# Patient Record
Sex: FEMALE | Race: WHITE | Employment: FULL TIME | URBAN - METROPOLITAN AREA
[De-identification: names, ages, dates, MRNs, and addresses within clinical notes are randomized per-mention and may not be internally consistent; named-entity substitution may affect disease eponyms.]

---

## 2018-05-11 DIAGNOSIS — N39.0 URINARY TRACT INFECTION WITH HEMATURIA, SITE UNSPECIFIED: Primary | ICD-10-CM

## 2018-05-11 DIAGNOSIS — R31.9 URINARY TRACT INFECTION WITH HEMATURIA, SITE UNSPECIFIED: Primary | ICD-10-CM

## 2018-05-11 RX ORDER — NITROFURANTOIN 25; 75 MG/1; MG/1
100 CAPSULE ORAL 2 TIMES DAILY
Qty: 14 CAPSULE | Refills: 0 | Status: SHIPPED | OUTPATIENT
Start: 2018-05-11 | End: 2018-05-18

## 2018-06-14 ENCOUNTER — HOSPITAL ENCOUNTER (OUTPATIENT)
Age: 53
Discharge: HOME | End: 2018-06-16
Payer: COMMERCIAL

## 2018-06-14 LAB
ALBUMIN SERPL-MCNC: 4.3 G/DL (ref 3.5–5.2)
ALP BLD-CCNC: 86 U/L (ref 35–104)
ALT SERPL-CCNC: 13 U/L (ref 0–32)
ANION GAP SERPL CALCULATED.3IONS-SCNC: 15 MMOL/L (ref 7–16)
AST SERPL-CCNC: 13 U/L (ref 0–31)
BASOPHILS ABSOLUTE: 0.12 E9/L (ref 0–0.2)
BASOPHILS RELATIVE PERCENT: 1.7 % (ref 0–2)
BILIRUB SERPL-MCNC: 0.8 MG/DL (ref 0–1.2)
BUN BLDV-MCNC: 16 MG/DL (ref 6–20)
CALCIUM SERPL-MCNC: 9.4 MG/DL (ref 8.6–10.2)
CHLORIDE BLD-SCNC: 101 MMOL/L (ref 98–107)
CHOLESTEROL, TOTAL: 191 MG/DL (ref 0–199)
CO2: 25 MMOL/L (ref 22–29)
CREAT SERPL-MCNC: 0.7 MG/DL (ref 0.5–1)
EOSINOPHILS ABSOLUTE: 0.42 E9/L (ref 0.05–0.5)
EOSINOPHILS RELATIVE PERCENT: 5.9 % (ref 0–6)
GFR AFRICAN AMERICAN: >60
GFR NON-AFRICAN AMERICAN: >60 ML/MIN/1.73
GLUCOSE BLD-MCNC: 96 MG/DL (ref 74–109)
HCT VFR BLD CALC: 44.5 % (ref 34–48)
HDLC SERPL-MCNC: 52 MG/DL
HEMOGLOBIN: 14.2 G/DL (ref 11.5–15.5)
IMMATURE GRANULOCYTES #: 0.02 E9/L
IMMATURE GRANULOCYTES %: 0.3 % (ref 0–5)
LDL CHOLESTEROL CALCULATED: 121 MG/DL (ref 0–99)
LYMPHOCYTES ABSOLUTE: 1.74 E9/L (ref 1.5–4)
LYMPHOCYTES RELATIVE PERCENT: 24.5 % (ref 20–42)
MCH RBC QN AUTO: 26.5 PG (ref 26–35)
MCHC RBC AUTO-ENTMCNC: 31.9 % (ref 32–34.5)
MCV RBC AUTO: 83.2 FL (ref 80–99.9)
MONOCYTES ABSOLUTE: 0.54 E9/L (ref 0.1–0.95)
MONOCYTES RELATIVE PERCENT: 7.6 % (ref 2–12)
NEUTROPHILS ABSOLUTE: 4.25 E9/L (ref 1.8–7.3)
NEUTROPHILS RELATIVE PERCENT: 60 % (ref 43–80)
PDW BLD-RTO: 13.6 FL (ref 11.5–15)
PLATELET # BLD: 444 E9/L (ref 130–450)
PMV BLD AUTO: 10.1 FL (ref 7–12)
POTASSIUM SERPL-SCNC: 3.9 MMOL/L (ref 3.5–5)
RBC # BLD: 5.35 E12/L (ref 3.5–5.5)
SODIUM BLD-SCNC: 141 MMOL/L (ref 132–146)
T4 FREE: 1.66 NG/DL (ref 0.93–1.7)
TOTAL PROTEIN: 7.5 G/DL (ref 6.4–8.3)
TRIGL SERPL-MCNC: 88 MG/DL (ref 0–149)
TSH SERPL DL<=0.05 MIU/L-ACNC: 1.05 UIU/ML (ref 0.27–4.2)
VLDLC SERPL CALC-MCNC: 18 MG/DL
WBC # BLD: 7.1 E9/L (ref 4.5–11.5)

## 2018-06-14 PROCEDURE — 85025 COMPLETE CBC W/AUTO DIFF WBC: CPT

## 2018-06-14 PROCEDURE — 84439 ASSAY OF FREE THYROXINE: CPT

## 2018-06-14 PROCEDURE — 84443 ASSAY THYROID STIM HORMONE: CPT

## 2018-06-14 PROCEDURE — 80061 LIPID PANEL: CPT

## 2018-06-14 PROCEDURE — 80053 COMPREHEN METABOLIC PANEL: CPT

## 2018-09-28 ENCOUNTER — NURSE ONLY (OUTPATIENT)
Dept: PRIMARY CARE CLINIC | Age: 53
End: 2018-09-28
Payer: COMMERCIAL

## 2018-09-28 DIAGNOSIS — Z23 NEED FOR INFLUENZA VACCINATION: Primary | ICD-10-CM

## 2018-09-28 PROCEDURE — 90686 IIV4 VACC NO PRSV 0.5 ML IM: CPT | Performed by: FAMILY MEDICINE

## 2018-09-28 PROCEDURE — 90471 IMMUNIZATION ADMIN: CPT | Performed by: FAMILY MEDICINE

## 2018-10-11 ENCOUNTER — HOSPITAL ENCOUNTER (OUTPATIENT)
Dept: GENERAL RADIOLOGY | Age: 53
Discharge: HOME | End: 2018-10-13
Payer: COMMERCIAL

## 2018-10-11 DIAGNOSIS — E28.39 HYPOGONADISM, OVARIAN: ICD-10-CM

## 2018-10-11 DIAGNOSIS — N95.1 MENOPAUSAL SYMPTOMS: ICD-10-CM

## 2018-10-11 DIAGNOSIS — Z12.31 VISIT FOR SCREENING MAMMOGRAM: ICD-10-CM

## 2018-10-11 PROCEDURE — 77080 DXA BONE DENSITY AXIAL: CPT

## 2018-10-11 PROCEDURE — 77063 BREAST TOMOSYNTHESIS BI: CPT

## 2018-10-30 ENCOUNTER — HOSPITAL ENCOUNTER (OUTPATIENT)
Dept: ULTRASOUND IMAGING | Age: 53
Discharge: HOME | End: 2018-11-01
Payer: COMMERCIAL

## 2018-10-30 DIAGNOSIS — M79.661 PAIN OF RIGHT CALF: ICD-10-CM

## 2018-10-30 DIAGNOSIS — M79.661 RIGHT CALF PAIN: Primary | ICD-10-CM

## 2018-10-30 PROCEDURE — 93971 EXTREMITY STUDY: CPT

## 2018-10-30 NOTE — PROGRESS NOTES
Patient recently back from the cruise. She was on airplane as well. Patient now having right calf pain. Physical exam: Right calf with tenderness to palpation. No redness or warmth to the skin noted. Assessment: Right calf pain and swelling. Plan: Ultrasound right lower extremity.

## 2018-10-31 ENCOUNTER — HOSPITAL ENCOUNTER (OUTPATIENT)
Dept: ULTRASOUND IMAGING | Age: 53
Discharge: HOME | End: 2018-11-02
Payer: COMMERCIAL

## 2018-10-31 DIAGNOSIS — M79.661 RIGHT CALF PAIN: ICD-10-CM

## 2018-12-18 ENCOUNTER — TELEPHONE (OUTPATIENT)
Dept: ADMINISTRATIVE | Age: 53
End: 2018-12-18

## 2019-01-21 ENCOUNTER — OFFICE VISIT (OUTPATIENT)
Dept: PRIMARY CARE CLINIC | Age: 54
End: 2019-01-21
Payer: COMMERCIAL

## 2019-01-21 VITALS
SYSTOLIC BLOOD PRESSURE: 120 MMHG | TEMPERATURE: 97.3 F | HEART RATE: 69 BPM | OXYGEN SATURATION: 98 % | DIASTOLIC BLOOD PRESSURE: 82 MMHG

## 2019-01-21 DIAGNOSIS — J06.9 UPPER RESPIRATORY TRACT INFECTION, UNSPECIFIED TYPE: ICD-10-CM

## 2019-01-21 DIAGNOSIS — J45.21 MILD INTERMITTENT ASTHMA WITH ACUTE EXACERBATION: Primary | ICD-10-CM

## 2019-01-21 PROCEDURE — 99213 OFFICE O/P EST LOW 20 MIN: CPT | Performed by: PHYSICIAN ASSISTANT

## 2019-01-21 RX ORDER — PREDNISONE 20 MG/1
20 TABLET ORAL 2 TIMES DAILY
Qty: 10 TABLET | Refills: 0 | Status: SHIPPED | OUTPATIENT
Start: 2019-01-21 | End: 2019-01-26

## 2019-01-21 RX ORDER — ALBUTEROL SULFATE 90 UG/1
2 AEROSOL, METERED RESPIRATORY (INHALATION) EVERY 4 HOURS PRN
Qty: 1 INHALER | Refills: 0 | Status: SHIPPED
Start: 2019-01-21 | End: 2020-04-07 | Stop reason: SDUPTHER

## 2019-01-21 RX ORDER — AZITHROMYCIN 250 MG/1
250 TABLET, FILM COATED ORAL SEE ADMIN INSTRUCTIONS
Qty: 6 TABLET | Refills: 0 | Status: SHIPPED | OUTPATIENT
Start: 2019-01-21 | End: 2019-01-26

## 2019-01-21 RX ORDER — FLUTICASONE PROPIONATE 50 MCG
2 SPRAY, SUSPENSION (ML) NASAL DAILY
Qty: 1 BOTTLE | Refills: 0 | Status: SHIPPED | OUTPATIENT
Start: 2019-01-21 | End: 2019-09-26

## 2019-01-21 ASSESSMENT — PATIENT HEALTH QUESTIONNAIRE - PHQ9
1. LITTLE INTEREST OR PLEASURE IN DOING THINGS: 0
SUM OF ALL RESPONSES TO PHQ QUESTIONS 1-9: 0
SUM OF ALL RESPONSES TO PHQ9 QUESTIONS 1 & 2: 0
SUM OF ALL RESPONSES TO PHQ QUESTIONS 1-9: 0
2. FEELING DOWN, DEPRESSED OR HOPELESS: 0

## 2019-02-14 ENCOUNTER — TELEPHONE (OUTPATIENT)
Dept: PRIMARY CARE CLINIC | Age: 54
End: 2019-02-14

## 2019-02-14 DIAGNOSIS — R07.9 CHEST PAIN, UNSPECIFIED TYPE: Primary | ICD-10-CM

## 2019-02-14 DIAGNOSIS — R07.9 CHEST PAIN, UNSPECIFIED TYPE: ICD-10-CM

## 2019-02-14 PROCEDURE — 93000 ELECTROCARDIOGRAM COMPLETE: CPT | Performed by: FAMILY MEDICINE

## 2019-04-10 ENCOUNTER — OFFICE VISIT (OUTPATIENT)
Dept: CARDIOLOGY CLINIC | Age: 54
End: 2019-04-10
Payer: COMMERCIAL

## 2019-04-10 VITALS
DIASTOLIC BLOOD PRESSURE: 88 MMHG | RESPIRATION RATE: 16 BRPM | BODY MASS INDEX: 33.49 KG/M2 | HEIGHT: 62 IN | SYSTOLIC BLOOD PRESSURE: 120 MMHG | HEART RATE: 53 BPM | WEIGHT: 182 LBS

## 2019-04-10 DIAGNOSIS — R06.09 DYSPNEA ON EXERTION: Primary | ICD-10-CM

## 2019-04-10 DIAGNOSIS — I51.9 HEART PROBLEM: ICD-10-CM

## 2019-04-10 DIAGNOSIS — I34.1 MVP (MITRAL VALVE PROLAPSE): ICD-10-CM

## 2019-04-10 PROCEDURE — 93000 ELECTROCARDIOGRAM COMPLETE: CPT | Performed by: INTERNAL MEDICINE

## 2019-04-10 PROCEDURE — 99243 OFF/OP CNSLTJ NEW/EST LOW 30: CPT | Performed by: INTERNAL MEDICINE

## 2019-04-10 NOTE — PROGRESS NOTES
OUTPATIENT CARDIOLOGY CONSULT    Name: Carl Velasquez    Age: 47 y.o. Date of Service: 4/10/2019    Reason for Consultation: Dyspnea    Referring Physician: Jerrell Redd DO    History of Present Illness:  Carl Velasquez is a 47 y.o. female who presents today for further evaluation of dyspnea. She was previously evaluated by Dr. Shay Goodson. Her PMH is outlined in detail below (see A/P below). She is routinely active (including riding a bike 30 mis/day 3 days/week and exercising on a treadmill 30 mins/day 3 times/week) with no complaints of chest pain, SOB, palpitations, lightheadedness, dizziness, or syncope. No history of PND or orthopnea. She reports a > 3 month history of intermittent dyspnea -- not related to exertion, 1-2 episodes/month, episodes last seconds, \"think it might be associated with my allergies\". +ncreased stress at home (caring for her parents). She is currently with no active cardiac complaints at rest. SR on EKG. She works for [employer].     Review of Systems:  Cardiac: As per HPI  General: No fever, chills  Pulmonary: As per HPI  HEENT: No visual disturbances, difficult swallowing  GI: No nausea, vomiting  Endocrine: No thyroid disease or DM  Musculoskeletal: CASSIDY x 4, no focal motor deficits  Skin: Intact, no rashes  Neuro/Psych: No headache or seizures    Past Medical History:  Past Medical History:   Diagnosis Date    Asthma     no recent issues, was released from Dr. Malgorzata King, feel it is allergen induced , seasonal    Murmur     h/o, no longer present       Past Surgical History:  Past Surgical History:   Procedure Laterality Date    APPENDECTOMY  2001    COLONOSCOPY  08/09/2017    ECTOPIC PREGNANCY SURGERY  1995    THYROID SURGERY  1985    partial thyroidectomy       Family History:  Family History   Problem Relation Age of Onset    High Blood Pressure Mother     Heart Failure Father     Stroke Father     Pacemaker Father     Diabetes Father    [facility] COPD Brother        Social History:  Social History     Socioeconomic History    Marital status:      Spouse name: Not on file    Number of children: 3    Years of education: Not on file    Highest education level: Not on file   Occupational History    Not on file   Social Needs    Financial resource strain: Not on file    Food insecurity:     Worry: Not on file     Inability: Not on file    Transportation needs:     Medical: Not on file     Non-medical: Not on file   Tobacco Use    Smoking status: Never Smoker    Smokeless tobacco: Never Used   Substance and Sexual Activity    Alcohol use: No     Comment: social drinker     Drug use: No    Sexual activity: Not on file   Lifestyle    Physical activity:     Days per week: Not on file     Minutes per session: Not on file    Stress: Not on file   Relationships    Social connections:     Talks on phone: Not on file     Gets together: Not on file     Attends Gnosticism service: Not on file     Active member of club or organization: Not on file     Attends meetings of clubs or organizations: Not on file     Relationship status: Not on file    Intimate partner violence:     Fear of current or ex partner: Not on file     Emotionally abused: Not on file     Physically abused: Not on file     Forced sexual activity: Not on file   Other Topics Concern    Not on file   Social History Narrative    Not on file       Allergies:   Allergies   Allergen Reactions    Codeine Other (See Comments)     Heart raced, felt hot    Morphine Nausea Only    Demerol Hcl [Meperidine] Palpitations       Current Medications:  Current Outpatient Medications   Medication Sig Dispense Refill    albuterol sulfate HFA (PROAIR HFA) 108 (90 Base) MCG/ACT inhaler Inhale 2 puffs into the lungs every 4 hours as needed for Wheezing or Shortness of Breath 1 Inhaler 0    omeprazole (PRILOSEC) 40 MG delayed release capsule Take 40 mg by mouth nightly Indications: pt. is taking 1tablet 3times a week       fluticasone (FLONASE) 50 MCG/ACT nasal spray 2 sprays by Each Nare route daily 1 Bottle 0    fluticasone-vilanterol (BREO ELLIPTA) 100-25 MCG/INH AEPB Inhale 1 Act into the lungs daily 28 each 0    Loratadine (CLARITIN PO) Take 10 mg by mouth as needed (For Allergies)        No current facility-administered medications for this visit. Physical Exam:  /88   Pulse 53   Resp 16   Ht 5' 2\" (1.575 m)   Wt 182 lb (82.6 kg)   BMI 33.29 kg/m²   Wt Readings from Last 3 Encounters:   04/10/19 182 lb (82.6 kg)   11/21/17 188 lb (85.3 kg)   08/09/17 190 lb (86.2 kg)     Appearance: Awake, alert, no acute respiratory distress  Skin: Intact, no rash  Head: Normocephalic, atraumatic  Eyes: EOMI, no conjunctival erythema  ENMT: No pharyngeal erythema, MMM, no rhinorrhea  Neck: Supple, no elevated JVP, no carotid bruits  Lungs: Clear to auscultation bilaterally. No wheezes, rales, or rhonchi.   Cardiac: Regular rate and rhythm, +S1S2, no murmurs apparent  Abdomen: Soft, nontender, +bowel sounds  Extremities: Moves all extremities x 4, no lower extremity edema  Neurologic: No focal motor deficits apparent, normal mood and affect    Laboratory Tests:  Lab Results   Component Value Date    CREATININE 0.7 06/14/2018    BUN 16 06/14/2018     06/14/2018    K 3.9 06/14/2018     06/14/2018    CO2 25 06/14/2018     Lab Results   Component Value Date    MG 2.3 03/01/2016     Lab Results   Component Value Date    WBC 7.1 06/14/2018    HGB 14.2 06/14/2018    HCT 44.5 06/14/2018    MCV 83.2 06/14/2018     06/14/2018     Lab Results   Component Value Date    ALT 13 06/14/2018    AST 13 06/14/2018    ALKPHOS 86 06/14/2018    BILITOT 0.8 06/14/2018     Lab Results   Component Value Date    CKTOTAL 40 02/29/2016    CKMB 1.6 02/29/2016    TROPONINI <0.01 02/29/2016    TROPONINI <0.01 02/16/2016    TROPONINI <0.01 02/15/2016     Lab Results   Component Value Date    INR 1.5 02/29/2016 INR 1.5 02/15/2016    PROTIME 16.1 (H) 02/29/2016    PROTIME 17.3 (H) 02/15/2016     Lab Results   Component Value Date    TSH 1.050 06/14/2018     Lab Results   Component Value Date    LABA1C 5.8 12/14/2017     No results found for: EAG  Lab Results   Component Value Date    CHOL 191 06/14/2018    CHOL 192 12/14/2017    CHOL 186 03/17/2017     Lab Results   Component Value Date    TRIG 88 06/14/2018    TRIG 95 12/14/2017    TRIG 98 03/17/2017     Lab Results   Component Value Date    HDL 52 06/14/2018    HDL 48 12/14/2017    HDL 60 03/17/2017     Lab Results   Component Value Date    LDLCALC 121 (H) 06/14/2018    LDLCALC 125 (H) 12/14/2017    LDLCALC 106 (H) 03/17/2017     Lab Results   Component Value Date    LABVLDL 18 06/14/2018    LABVLDL 19 12/14/2017    LABVLDL 20 03/17/2017     No results found for: CHOLHDLRATIO  No results for input(s): PROBNP in the last 72 hours. Cardiac Tests:  ECG: SB, NSSTT changes    Exercise nuclear stress test: 2/18/16  IMPRESSION:  Normal examination.  In particular, there is no   evidence of treadmill stress induced left ventricular myocardial   ischemia.      Gated study shows normal left   ventricular wall motion and myocardial thickening during systole. Resting left ventricular ejection fraction over 70%. Echocardiogram: 2/17/16 (Dr. Barbara Richardson)   Left ventricle grossly normal in size.   Mild left ventricular concentric hypertrophy noted.   Hyperdynamic. LV segmental wall motion.   Estimated left ventricular ejection fraction is 75 %.   Normal left ventricular diastolic filling velocities for age.   Estimated wedge pressure is 7 mmHg.   Normal right ventricular size and function.   Physiologic and/or trace mitral regurgitation is present.   Physiologic and/or trace tricuspid regurgitation. Technically good quality study.   Technically good quality study. No comparison study available. Suggest clinical correlation.     Echocardiogram: 6/28/18 (Dr. Barbara Richardson)  Normal LV size and wall thickness  Normal LV function / EF 65-70%  Normal RV size and function  Trace MR  Trace TR / normal estimated PASP      ASSESSMENT / PLAN:  1. Dyspnea (as outlined above in HPI) -- routinely active with no cardiac complaints  2. History of possible MVP per patient -- recent echocardiograms with no mention of MVP  3. BMI 33.3  4. GERD  5. Asthma / allergies    - Prior cardiac studies reviewed today (pertinent results outlined above)  - Continue current medications  - Pending clinical course, options for cardiac testing discussed (decision made to hold off at this time)    Thank you for allowing me to participate in your patient's care. Please feel free to contact me if you have any questions or concerns.     Betsy Martínez MD  [facility]) Cardiology

## 2019-09-26 ENCOUNTER — OFFICE VISIT (OUTPATIENT)
Dept: PRIMARY CARE CLINIC | Age: 54
End: 2019-09-26
Payer: COMMERCIAL

## 2019-09-26 VITALS
OXYGEN SATURATION: 98 % | TEMPERATURE: 97.9 F | DIASTOLIC BLOOD PRESSURE: 84 MMHG | HEART RATE: 69 BPM | SYSTOLIC BLOOD PRESSURE: 138 MMHG

## 2019-09-26 DIAGNOSIS — J01.90 ACUTE NON-RECURRENT SINUSITIS, UNSPECIFIED LOCATION: Primary | ICD-10-CM

## 2019-09-26 PROCEDURE — 99213 OFFICE O/P EST LOW 20 MIN: CPT | Performed by: PHYSICIAN ASSISTANT

## 2019-09-26 RX ORDER — AMOXICILLIN AND CLAVULANATE POTASSIUM 875; 125 MG/1; MG/1
1 TABLET, FILM COATED ORAL 2 TIMES DAILY
Qty: 14 TABLET | Refills: 0 | Status: SHIPPED | OUTPATIENT
Start: 2019-09-26 | End: 2019-09-27 | Stop reason: SINTOL

## 2019-09-26 NOTE — PROGRESS NOTES
her mother; Pacemaker in her father; Stroke in her father. Allergies: Codeine; Morphine; and Demerol hcl [meperidine]    Physical Exam         VS:  /84   Pulse 69   Temp 97.9 °F (36.6 °C)   LMP 05/31/2017   SpO2 98%    Oxygen Saturation Interpretation: Normal.    Constitutional:  Alert, development consistent with age. Ears:  External Ears: Bilateral pinna normal. TM's without erythema or perforation bilaterally. Canals normal bilaterally. Moderate serous fluid noted on the L. Nose:   There is bilateral injection and edema to turbinates bilaterally. +TTP over maxillary sinuses. Mouth: Normal to inspection. Throat: Mild posterior pharyngeal erythema with mild post nasal drip present. No exudate. Neck:  Supple. There is no obvious head or neck adenopathy. Lungs:   Breath sounds: Non-labored, equal, and without adventitious sounds. No wheezing, rales, or rhonchi. Heart:  Regular rate and rhythm, normal heart sounds, without pathological murmurs, ectopy, gallops, or rubs. Skin:  Normal turgor. Warm, dry, without visible rash. Neurological:  Alert and oriented. Motor functions intact. Responds to verbal commands. Lab / Imaging Results   (All laboratory and radiology results have been personally reviewed by myself)  Labs:  No results found for this visit on 09/26/19. Assessment / Plan     Impression(s):  1. Acute non-recurrent sinusitis, unspecified location      Disposition:    Rx (Augmentin) were discussed with patient includining indications and adverse effects, pt agrees and understands use. Recommended fluids and rest. OTC Ibuprofen/Tylenol if needed for pain/fever. Nasal saline spray recommended due to bloody noses, once stops can resume the flonase, OTC Mucinex or keep with the advil sinus. FU if Sx persist or worsen, if severe or worrisome-go to ER. Otherwise, FU for routine care.

## 2019-09-27 RX ORDER — AZITHROMYCIN 250 MG/1
250 TABLET, FILM COATED ORAL SEE ADMIN INSTRUCTIONS
Qty: 6 TABLET | Refills: 0 | Status: SHIPPED | OUTPATIENT
Start: 2019-09-27 | End: 2019-10-02

## 2019-10-18 ENCOUNTER — HOSPITAL ENCOUNTER (OUTPATIENT)
Dept: GENERAL RADIOLOGY | Age: 54
Discharge: HOME | End: 2019-10-20
Payer: COMMERCIAL

## 2019-10-18 DIAGNOSIS — Z12.31 ENCOUNTER FOR SCREENING MAMMOGRAM FOR MALIGNANT NEOPLASM OF BREAST: ICD-10-CM

## 2019-10-18 PROCEDURE — 77063 BREAST TOMOSYNTHESIS BI: CPT

## 2020-01-03 ENCOUNTER — TELEPHONE (OUTPATIENT)
Dept: PRIMARY CARE CLINIC | Age: 55
End: 2020-01-03
Payer: COMMERCIAL

## 2020-01-03 LAB
APPEARANCE FLUID: NORMAL
BILIRUBIN, POC: NORMAL
BLOOD URINE, POC: NORMAL
CLARITY, POC: CLEAR
COLOR, POC: YELLOW
GLUCOSE URINE, POC: NORMAL
KETONES, POC: NORMAL
LEUKOCYTE EST, POC: NORMAL
NITRITE, POC: NORMAL
PH, POC: 5
PROTEIN, POC: NORMAL
SPECIFIC GRAVITY, POC: 1.02
UROBILINOGEN, POC: 0.2

## 2020-01-03 PROCEDURE — 81002 URINALYSIS NONAUTO W/O SCOPE: CPT | Performed by: PHYSICIAN ASSISTANT

## 2020-01-03 RX ORDER — NITROFURANTOIN 25; 75 MG/1; MG/1
100 CAPSULE ORAL 2 TIMES DAILY
Qty: 14 CAPSULE | Refills: 0 | Status: SHIPPED | OUTPATIENT
Start: 2020-01-03 | End: 2020-01-21 | Stop reason: SDUPTHER

## 2020-01-10 ENCOUNTER — OFFICE VISIT (OUTPATIENT)
Dept: PRIMARY CARE CLINIC | Age: 55
End: 2020-01-10
Payer: COMMERCIAL

## 2020-01-10 ENCOUNTER — HOSPITAL ENCOUNTER (OUTPATIENT)
Age: 55
Discharge: HOME | End: 2020-01-12
Payer: COMMERCIAL

## 2020-01-10 VITALS
SYSTOLIC BLOOD PRESSURE: 130 MMHG | OXYGEN SATURATION: 98 % | DIASTOLIC BLOOD PRESSURE: 84 MMHG | HEART RATE: 74 BPM | TEMPERATURE: 97.2 F

## 2020-01-10 LAB
BASOPHILS ABSOLUTE: 0.09 E9/L (ref 0–0.2)
BASOPHILS RELATIVE PERCENT: 1 % (ref 0–2)
EOSINOPHILS ABSOLUTE: 0.91 E9/L (ref 0.05–0.5)
EOSINOPHILS RELATIVE PERCENT: 9.8 % (ref 0–6)
HCT VFR BLD CALC: 47.9 % (ref 34–48)
HEMOGLOBIN: 14.8 G/DL (ref 11.5–15.5)
IMMATURE GRANULOCYTES #: 0.03 E9/L
IMMATURE GRANULOCYTES %: 0.3 % (ref 0–5)
LYMPHOCYTES ABSOLUTE: 2.13 E9/L (ref 1.5–4)
LYMPHOCYTES RELATIVE PERCENT: 22.9 % (ref 20–42)
MCH RBC QN AUTO: 26.5 PG (ref 26–35)
MCHC RBC AUTO-ENTMCNC: 30.9 % (ref 32–34.5)
MCV RBC AUTO: 85.8 FL (ref 80–99.9)
MONOCYTES ABSOLUTE: 0.72 E9/L (ref 0.1–0.95)
MONOCYTES RELATIVE PERCENT: 7.7 % (ref 2–12)
NEUTROPHILS ABSOLUTE: 5.43 E9/L (ref 1.8–7.3)
NEUTROPHILS RELATIVE PERCENT: 58.3 % (ref 43–80)
PDW BLD-RTO: 12.2 FL (ref 11.5–15)
PLATELET # BLD: 477 E9/L (ref 130–450)
PMV BLD AUTO: 10.3 FL (ref 7–12)
RBC # BLD: 5.58 E12/L (ref 3.5–5.5)
WBC # BLD: 9.3 E9/L (ref 4.5–11.5)

## 2020-01-10 PROCEDURE — 99213 OFFICE O/P EST LOW 20 MIN: CPT | Performed by: FAMILY MEDICINE

## 2020-01-10 PROCEDURE — 86664 EPSTEIN-BARR NUCLEAR ANTIGEN: CPT

## 2020-01-10 PROCEDURE — 85025 COMPLETE CBC W/AUTO DIFF WBC: CPT

## 2020-01-10 PROCEDURE — 86665 EPSTEIN-BARR CAPSID VCA: CPT

## 2020-01-10 PROCEDURE — 86663 EPSTEIN-BARR ANTIBODY: CPT

## 2020-01-10 RX ORDER — METHYLPREDNISOLONE 4 MG/1
TABLET ORAL
Qty: 1 KIT | Refills: 0 | Status: SHIPPED | OUTPATIENT
Start: 2020-01-10 | End: 2020-01-22 | Stop reason: ALTCHOICE

## 2020-01-10 ASSESSMENT — PATIENT HEALTH QUESTIONNAIRE - PHQ9
SUM OF ALL RESPONSES TO PHQ QUESTIONS 1-9: 0
SUM OF ALL RESPONSES TO PHQ9 QUESTIONS 1 & 2: 0
1. LITTLE INTEREST OR PLEASURE IN DOING THINGS: 0
2. FEELING DOWN, DEPRESSED OR HOPELESS: 0
SUM OF ALL RESPONSES TO PHQ QUESTIONS 1-9: 0

## 2020-01-10 ASSESSMENT — ENCOUNTER SYMPTOMS
VOMITING: 0
SWOLLEN GLANDS: 1
SORE THROAT: 0
COUGH: 0

## 2020-01-10 NOTE — PROGRESS NOTES
Hugo Bailey, a female of 49 y.o. came to the office 1/10/2020. Patient Active Problem List   Diagnosis    Hypothyroidism    Mitral valve prolapse    Eosinophilia    Asthma exacerbation    SIRS (systemic inflammatory response syndrome) (HCC)    Microcytosis          Other   This is a new problem. The current episode started 1 to 4 weeks ago (10 days). The problem has been gradually worsening. Associated symptoms include fatigue, headaches and swollen glands (b/l parotid glands 3 days ago worse last pm.  applying ice to area. ). Pertinent negatives include no anorexia, chills, congestion, coughing, diaphoresis, fever, myalgias, neck pain, sore throat, vomiting or weakness. Nothing aggravates the symptoms. Treatments tried: took amoxil 500 mg bid for 1 wk. Allergies   Allergen Reactions    Codeine Other (See Comments)     Heart raced, felt hot    Morphine Nausea Only    Demerol Hcl [Meperidine] Palpitations       Current Outpatient Medications on File Prior to Visit   Medication Sig Dispense Refill    nitrofurantoin, macrocrystal-monohydrate, (MACROBID) 100 MG capsule Take 1 capsule by mouth 2 times daily for 7 days 14 capsule 0    albuterol sulfate HFA (PROAIR HFA) 108 (90 Base) MCG/ACT inhaler Inhale 2 puffs into the lungs every 4 hours as needed for Wheezing or Shortness of Breath 1 Inhaler 0    omeprazole (PRILOSEC) 40 MG delayed release capsule Take 40 mg by mouth nightly Indications: pt. is taking 1tablet 3times a week        No current facility-administered medications on file prior to visit. Review of Systems   Constitutional: Positive for fatigue. Negative for chills, diaphoresis and fever. HENT: Negative for congestion and sore throat. Respiratory: Negative for cough. Gastrointestinal: Negative for anorexia and vomiting. Musculoskeletal: Negative for myalgias and neck pain. Neurological: Positive for headaches. Negative for weakness.    other review of systems

## 2020-01-13 ENCOUNTER — TELEPHONE (OUTPATIENT)
Dept: PRIMARY CARE CLINIC | Age: 55
End: 2020-01-13

## 2020-01-13 LAB
EPSTEIN BARR VIRUS NUCLEAR AB IGG: <3 U/ML (ref 0–21.9)
EPSTEIN-BARR EARLY ANTIGEN ANTIBODY: 5.2 U/ML (ref 0–10.9)
EPSTEIN-BARR VCA IGG: 292 U/ML (ref 0–21.9)
EPSTEIN-BARR VCA IGM: <10 U/ML (ref 0–43.9)

## 2020-01-13 NOTE — TELEPHONE ENCOUNTER
Spoke with patient she is feeling better with decreased swelling in her parotid glands. She is doing well on the steroid. No fever or chills. I reviewed her lab results with her. Continue current care.

## 2020-01-21 RX ORDER — NITROFURANTOIN 25; 75 MG/1; MG/1
100 CAPSULE ORAL 2 TIMES DAILY
Qty: 14 CAPSULE | Refills: 0 | Status: ON HOLD | OUTPATIENT
Start: 2020-01-21 | End: 2020-01-23 | Stop reason: HOSPADM

## 2020-01-22 ENCOUNTER — APPOINTMENT (OUTPATIENT)
Dept: GENERAL RADIOLOGY | Age: 55
DRG: 313 | End: 2020-01-22
Payer: COMMERCIAL

## 2020-01-22 ENCOUNTER — APPOINTMENT (OUTPATIENT)
Dept: CT IMAGING | Age: 55
DRG: 313 | End: 2020-01-22
Payer: COMMERCIAL

## 2020-01-22 ENCOUNTER — NURSE TRIAGE (OUTPATIENT)
Dept: OTHER | Facility: CLINIC | Age: 55
End: 2020-01-22

## 2020-01-22 ENCOUNTER — HOSPITAL ENCOUNTER (INPATIENT)
Age: 55
LOS: 1 days | Discharge: HOME | DRG: 313 | End: 2020-01-23
Attending: EMERGENCY MEDICINE | Admitting: INTERNAL MEDICINE
Payer: COMMERCIAL

## 2020-01-22 PROBLEM — R55 SYNCOPAL EPISODES: Status: ACTIVE | Noted: 2020-01-22

## 2020-01-22 PROBLEM — R07.9 CHEST PAIN: Status: ACTIVE | Noted: 2020-01-22

## 2020-01-22 PROBLEM — R93.89 ABNORMAL CT OF THE CHEST: Status: ACTIVE | Noted: 2020-01-22

## 2020-01-22 LAB
ADENOVIRUS BY PCR: NOT DETECTED
ALBUMIN SERPL-MCNC: 4.3 G/DL (ref 3.5–5.2)
ALP BLD-CCNC: 101 U/L (ref 35–104)
ALT SERPL-CCNC: 18 U/L (ref 0–32)
ANION GAP SERPL CALCULATED.3IONS-SCNC: 14 MMOL/L (ref 7–16)
APTT: 28.5 SEC (ref 24.5–35.1)
AST SERPL-CCNC: 17 U/L (ref 0–31)
BACTERIA: ABNORMAL /HPF
BASOPHILS ABSOLUTE: 0.04 E9/L (ref 0–0.2)
BASOPHILS RELATIVE PERCENT: 0.2 % (ref 0–2)
BILIRUB SERPL-MCNC: 0.7 MG/DL (ref 0–1.2)
BILIRUBIN URINE: NEGATIVE
BLOOD, URINE: ABNORMAL
BORDETELLA PARAPERTUSSIS BY PCR: NOT DETECTED
BORDETELLA PERTUSSIS BY PCR: NOT DETECTED
BUN BLDV-MCNC: 13 MG/DL (ref 6–20)
C-REACTIVE PROTEIN: 5.3 MG/DL (ref 0–0.4)
CALCIUM SERPL-MCNC: 9.6 MG/DL (ref 8.6–10.2)
CHLAMYDOPHILIA PNEUMONIAE BY PCR: NOT DETECTED
CHLORIDE BLD-SCNC: 102 MMOL/L (ref 98–107)
CHOLESTEROL, TOTAL: 237 MG/DL (ref 0–199)
CLARITY: CLEAR
CO2: 25 MMOL/L (ref 22–29)
COLOR: YELLOW
CORONAVIRUS 229E BY PCR: NOT DETECTED
CORONAVIRUS HKU1 BY PCR: NOT DETECTED
CORONAVIRUS NL63 BY PCR: NOT DETECTED
CORONAVIRUS OC43 BY PCR: NOT DETECTED
CREAT SERPL-MCNC: 0.6 MG/DL (ref 0.5–1)
EKG ATRIAL RATE: 99 BPM
EKG P AXIS: 42 DEGREES
EKG P-R INTERVAL: 162 MS
EKG Q-T INTERVAL: 334 MS
EKG QRS DURATION: 84 MS
EKG QTC CALCULATION (BAZETT): 428 MS
EKG R AXIS: -7 DEGREES
EKG T AXIS: 31 DEGREES
EKG VENTRICULAR RATE: 99 BPM
EOSINOPHILS ABSOLUTE: 0.26 E9/L (ref 0.05–0.5)
EOSINOPHILS RELATIVE PERCENT: 1.5 % (ref 0–6)
GFR AFRICAN AMERICAN: >60
GFR NON-AFRICAN AMERICAN: >60 ML/MIN/1.73
GLUCOSE BLD-MCNC: 143 MG/DL (ref 74–99)
GLUCOSE URINE: NEGATIVE MG/DL
HCG QUALITATIVE: NEGATIVE
HCT VFR BLD CALC: 46.9 % (ref 34–48)
HDLC SERPL-MCNC: 69 MG/DL
HEMOGLOBIN: 15.3 G/DL (ref 11.5–15.5)
HUMAN METAPNEUMOVIRUS BY PCR: NOT DETECTED
HUMAN RHINOVIRUS/ENTEROVIRUS BY PCR: NOT DETECTED
IMMATURE GRANULOCYTES #: 0.08 E9/L
IMMATURE GRANULOCYTES %: 0.5 % (ref 0–5)
INFLUENZA A BY PCR: NOT DETECTED
INFLUENZA B BY PCR: NOT DETECTED
INR BLD: 1.5
KETONES, URINE: NEGATIVE MG/DL
LDL CHOLESTEROL CALCULATED: 152 MG/DL (ref 0–99)
LEUKOCYTE ESTERASE, URINE: ABNORMAL
LIPASE: 34 U/L (ref 13–60)
LYMPHOCYTES ABSOLUTE: 0.58 E9/L (ref 1.5–4)
LYMPHOCYTES RELATIVE PERCENT: 3.5 % (ref 20–42)
MCH RBC QN AUTO: 27.3 PG (ref 26–35)
MCHC RBC AUTO-ENTMCNC: 32.6 % (ref 32–34.5)
MCV RBC AUTO: 83.6 FL (ref 80–99.9)
MONOCYTES ABSOLUTE: 0.81 E9/L (ref 0.1–0.95)
MONOCYTES RELATIVE PERCENT: 4.8 % (ref 2–12)
MYCOPLASMA PNEUMONIAE BY PCR: NOT DETECTED
NEUTROPHILS ABSOLUTE: 15.03 E9/L (ref 1.8–7.3)
NEUTROPHILS RELATIVE PERCENT: 89.5 % (ref 43–80)
NITRITE, URINE: NEGATIVE
PARAINFLUENZA VIRUS 1 BY PCR: NOT DETECTED
PARAINFLUENZA VIRUS 2 BY PCR: NOT DETECTED
PARAINFLUENZA VIRUS 3 BY PCR: NOT DETECTED
PARAINFLUENZA VIRUS 4 BY PCR: NOT DETECTED
PDW BLD-RTO: 12.3 FL (ref 11.5–15)
PH UA: 8 (ref 5–9)
PLATELET # BLD: 364 E9/L (ref 130–450)
PMV BLD AUTO: 9.9 FL (ref 7–12)
POTASSIUM SERPL-SCNC: 3.7 MMOL/L (ref 3.5–5)
PROCALCITONIN: 0.73 NG/ML (ref 0–0.08)
PROTEIN UA: NEGATIVE MG/DL
PROTHROMBIN TIME: 16.9 SEC (ref 9.3–12.4)
RBC # BLD: 5.61 E12/L (ref 3.5–5.5)
RBC UA: ABNORMAL /HPF (ref 0–2)
RESPIRATORY SYNCYTIAL VIRUS BY PCR: NOT DETECTED
SEDIMENTATION RATE, ERYTHROCYTE: 3 MM/HR (ref 0–20)
SODIUM BLD-SCNC: 141 MMOL/L (ref 132–146)
SPECIFIC GRAVITY UA: 1.01 (ref 1–1.03)
TOTAL PROTEIN: 7.5 G/DL (ref 6.4–8.3)
TRIGL SERPL-MCNC: 80 MG/DL (ref 0–149)
TROPONIN: <0.01 NG/ML (ref 0–0.03)
UROBILINOGEN, URINE: 0.2 E.U./DL
VLDLC SERPL CALC-MCNC: 16 MG/DL
WBC # BLD: 16.8 E9/L (ref 4.5–11.5)
WBC UA: ABNORMAL /HPF (ref 0–5)

## 2020-01-22 PROCEDURE — 0100U HC RESPIRPTHGN MULT REV TRANS & AMP PRB TECH 21 TRGT: CPT

## 2020-01-22 PROCEDURE — 96374 THER/PROPH/DIAG INJ IV PUSH: CPT

## 2020-01-22 PROCEDURE — 36415 COLL VENOUS BLD VENIPUNCTURE: CPT

## 2020-01-22 PROCEDURE — 82164 ANGIOTENSIN I ENZYME TEST: CPT

## 2020-01-22 PROCEDURE — 84484 ASSAY OF TROPONIN QUANT: CPT

## 2020-01-22 PROCEDURE — 6360000002 HC RX W HCPCS: Performed by: INTERNAL MEDICINE

## 2020-01-22 PROCEDURE — 6370000000 HC RX 637 (ALT 250 FOR IP): Performed by: PHYSICIAN ASSISTANT

## 2020-01-22 PROCEDURE — 80053 COMPREHEN METABOLIC PANEL: CPT

## 2020-01-22 PROCEDURE — 6360000002 HC RX W HCPCS: Performed by: PHYSICIAN ASSISTANT

## 2020-01-22 PROCEDURE — 97530 THERAPEUTIC ACTIVITIES: CPT

## 2020-01-22 PROCEDURE — 85730 THROMBOPLASTIN TIME PARTIAL: CPT

## 2020-01-22 PROCEDURE — 71275 CT ANGIOGRAPHY CHEST: CPT

## 2020-01-22 PROCEDURE — 93005 ELECTROCARDIOGRAM TRACING: CPT | Performed by: EMERGENCY MEDICINE

## 2020-01-22 PROCEDURE — 84145 PROCALCITONIN (PCT): CPT

## 2020-01-22 PROCEDURE — 83690 ASSAY OF LIPASE: CPT

## 2020-01-22 PROCEDURE — 93010 ELECTROCARDIOGRAM REPORT: CPT | Performed by: INTERNAL MEDICINE

## 2020-01-22 PROCEDURE — 94664 DEMO&/EVAL PT USE INHALER: CPT

## 2020-01-22 PROCEDURE — 81001 URINALYSIS AUTO W/SCOPE: CPT

## 2020-01-22 PROCEDURE — 94640 AIRWAY INHALATION TREATMENT: CPT

## 2020-01-22 PROCEDURE — 86140 C-REACTIVE PROTEIN: CPT

## 2020-01-22 PROCEDURE — 87450 HC DIRECT STREP B ANTIGEN: CPT

## 2020-01-22 PROCEDURE — 96376 TX/PRO/DX INJ SAME DRUG ADON: CPT

## 2020-01-22 PROCEDURE — 74176 CT ABD & PELVIS W/O CONTRAST: CPT

## 2020-01-22 PROCEDURE — 80061 LIPID PANEL: CPT

## 2020-01-22 PROCEDURE — 96365 THER/PROPH/DIAG IV INF INIT: CPT

## 2020-01-22 PROCEDURE — 87385 HISTOPLASMA CAPSUL AG IA: CPT

## 2020-01-22 PROCEDURE — 71045 X-RAY EXAM CHEST 1 VIEW: CPT

## 2020-01-22 PROCEDURE — 6360000002 HC RX W HCPCS: Performed by: EMERGENCY MEDICINE

## 2020-01-22 PROCEDURE — 96372 THER/PROPH/DIAG INJ SC/IM: CPT

## 2020-01-22 PROCEDURE — 96375 TX/PRO/DX INJ NEW DRUG ADDON: CPT

## 2020-01-22 PROCEDURE — 99253 IP/OBS CNSLTJ NEW/EST LOW 45: CPT | Performed by: INTERNAL MEDICINE

## 2020-01-22 PROCEDURE — 85610 PROTHROMBIN TIME: CPT

## 2020-01-22 PROCEDURE — 6360000004 HC RX CONTRAST MEDICATION: Performed by: RADIOLOGY

## 2020-01-22 PROCEDURE — 2140000000 HC CCU INTERMEDIATE R&B

## 2020-01-22 PROCEDURE — G0378 HOSPITAL OBSERVATION PER HR: HCPCS

## 2020-01-22 PROCEDURE — 2580000003 HC RX 258: Performed by: CLINICAL NURSE SPECIALIST

## 2020-01-22 PROCEDURE — 2580000003 HC RX 258: Performed by: PHYSICIAN ASSISTANT

## 2020-01-22 PROCEDURE — 99285 EMERGENCY DEPT VISIT HI MDM: CPT

## 2020-01-22 PROCEDURE — 6360000002 HC RX W HCPCS: Performed by: CLINICAL NURSE SPECIALIST

## 2020-01-22 PROCEDURE — 97162 PT EVAL MOD COMPLEX 30 MIN: CPT

## 2020-01-22 PROCEDURE — 85651 RBC SED RATE NONAUTOMATED: CPT

## 2020-01-22 PROCEDURE — 85025 COMPLETE CBC W/AUTO DIFF WBC: CPT

## 2020-01-22 PROCEDURE — 84703 CHORIONIC GONADOTROPIN ASSAY: CPT

## 2020-01-22 RX ORDER — SODIUM CHLORIDE 9 MG/ML
INJECTION, SOLUTION INTRAVENOUS CONTINUOUS
Status: DISCONTINUED | OUTPATIENT
Start: 2020-01-22 | End: 2020-01-23

## 2020-01-22 RX ORDER — ACETAMINOPHEN 325 MG/1
650 TABLET ORAL EVERY 4 HOURS PRN
Status: DISCONTINUED | OUTPATIENT
Start: 2020-01-22 | End: 2020-01-23 | Stop reason: HOSPADM

## 2020-01-22 RX ORDER — POTASSIUM CHLORIDE 20 MEQ/1
40 TABLET, EXTENDED RELEASE ORAL PRN
Status: DISCONTINUED | OUTPATIENT
Start: 2020-01-22 | End: 2020-01-23 | Stop reason: HOSPADM

## 2020-01-22 RX ORDER — POTASSIUM CHLORIDE 7.45 MG/ML
10 INJECTION INTRAVENOUS PRN
Status: DISCONTINUED | OUTPATIENT
Start: 2020-01-22 | End: 2020-01-23 | Stop reason: HOSPADM

## 2020-01-22 RX ORDER — METHYLPREDNISOLONE SODIUM SUCCINATE 40 MG/ML
40 INJECTION, POWDER, LYOPHILIZED, FOR SOLUTION INTRAMUSCULAR; INTRAVENOUS EVERY 8 HOURS
Status: DISCONTINUED | OUTPATIENT
Start: 2020-01-22 | End: 2020-01-23

## 2020-01-22 RX ORDER — PANTOPRAZOLE SODIUM 40 MG/1
40 TABLET, DELAYED RELEASE ORAL
Status: DISCONTINUED | OUTPATIENT
Start: 2020-01-23 | End: 2020-01-23 | Stop reason: HOSPADM

## 2020-01-22 RX ORDER — LORAZEPAM 2 MG/ML
0.5 INJECTION INTRAMUSCULAR ONCE
Status: COMPLETED | OUTPATIENT
Start: 2020-01-22 | End: 2020-01-22

## 2020-01-22 RX ORDER — SODIUM CHLORIDE 0.9 % (FLUSH) 0.9 %
10 SYRINGE (ML) INJECTION EVERY 12 HOURS SCHEDULED
Status: DISCONTINUED | OUTPATIENT
Start: 2020-01-22 | End: 2020-01-23 | Stop reason: HOSPADM

## 2020-01-22 RX ORDER — SODIUM CHLORIDE 0.9 % (FLUSH) 0.9 %
10 SYRINGE (ML) INJECTION PRN
Status: DISCONTINUED | OUTPATIENT
Start: 2020-01-22 | End: 2020-01-23 | Stop reason: HOSPADM

## 2020-01-22 RX ORDER — IPRATROPIUM BROMIDE AND ALBUTEROL SULFATE 2.5; .5 MG/3ML; MG/3ML
1 SOLUTION RESPIRATORY (INHALATION)
Status: DISCONTINUED | OUTPATIENT
Start: 2020-01-22 | End: 2020-01-23 | Stop reason: HOSPADM

## 2020-01-22 RX ORDER — LORAZEPAM 2 MG/ML
1 INJECTION INTRAMUSCULAR ONCE
Status: COMPLETED | OUTPATIENT
Start: 2020-01-22 | End: 2020-01-22

## 2020-01-22 RX ORDER — ONDANSETRON 2 MG/ML
4 INJECTION INTRAMUSCULAR; INTRAVENOUS EVERY 6 HOURS PRN
Status: DISCONTINUED | OUTPATIENT
Start: 2020-01-22 | End: 2020-01-23 | Stop reason: HOSPADM

## 2020-01-22 RX ORDER — NITROGLYCERIN 0.4 MG/1
0.4 TABLET SUBLINGUAL EVERY 5 MIN PRN
Status: DISCONTINUED | OUTPATIENT
Start: 2020-01-22 | End: 2020-01-23 | Stop reason: HOSPADM

## 2020-01-22 RX ORDER — ATORVASTATIN CALCIUM 40 MG/1
40 TABLET, FILM COATED ORAL NIGHTLY
Status: DISCONTINUED | OUTPATIENT
Start: 2020-01-22 | End: 2020-01-23 | Stop reason: HOSPADM

## 2020-01-22 RX ORDER — ONDANSETRON 2 MG/ML
4 INJECTION INTRAMUSCULAR; INTRAVENOUS ONCE
Status: COMPLETED | OUTPATIENT
Start: 2020-01-22 | End: 2020-01-22

## 2020-01-22 RX ORDER — NITROFURANTOIN 25; 75 MG/1; MG/1
100 CAPSULE ORAL 2 TIMES DAILY
Status: DISCONTINUED | OUTPATIENT
Start: 2020-01-22 | End: 2020-01-23 | Stop reason: HOSPADM

## 2020-01-22 RX ORDER — ASPIRIN 81 MG/1
324 TABLET, CHEWABLE ORAL ONCE
Status: DISCONTINUED | OUTPATIENT
Start: 2020-01-22 | End: 2020-01-22

## 2020-01-22 RX ORDER — ASPIRIN 81 MG/1
81 TABLET, CHEWABLE ORAL DAILY
Status: DISCONTINUED | OUTPATIENT
Start: 2020-01-23 | End: 2020-01-23 | Stop reason: HOSPADM

## 2020-01-22 RX ADMIN — LORAZEPAM 0.5 MG: 2 INJECTION INTRAMUSCULAR; INTRAVENOUS at 09:39

## 2020-01-22 RX ADMIN — AZITHROMYCIN MONOHYDRATE 500 MG: 500 INJECTION, POWDER, LYOPHILIZED, FOR SOLUTION INTRAVENOUS at 16:55

## 2020-01-22 RX ADMIN — LORAZEPAM 1 MG: 2 INJECTION INTRAMUSCULAR; INTRAVENOUS at 08:12

## 2020-01-22 RX ADMIN — IPRATROPIUM BROMIDE AND ALBUTEROL SULFATE 1 AMPULE: 2.5; .5 SOLUTION RESPIRATORY (INHALATION) at 21:07

## 2020-01-22 RX ADMIN — SODIUM CHLORIDE: 9 INJECTION, SOLUTION INTRAVENOUS at 14:53

## 2020-01-22 RX ADMIN — METHYLPREDNISOLONE SODIUM SUCCINATE 40 MG: 40 INJECTION, POWDER, FOR SOLUTION INTRAMUSCULAR; INTRAVENOUS at 23:55

## 2020-01-22 RX ADMIN — NITROFURANTOIN MONOHYDRATE/MACROCRYSTALLINE 100 MG: 25; 75 CAPSULE ORAL at 21:45

## 2020-01-22 RX ADMIN — IPRATROPIUM BROMIDE AND ALBUTEROL SULFATE 1 AMPULE: 2.5; .5 SOLUTION RESPIRATORY (INHALATION) at 16:13

## 2020-01-22 RX ADMIN — METHYLPREDNISOLONE SODIUM SUCCINATE 40 MG: 40 INJECTION, POWDER, FOR SOLUTION INTRAMUSCULAR; INTRAVENOUS at 15:41

## 2020-01-22 RX ADMIN — ENOXAPARIN SODIUM 40 MG: 40 INJECTION SUBCUTANEOUS at 14:53

## 2020-01-22 RX ADMIN — IOPAMIDOL 90 ML: 755 INJECTION, SOLUTION INTRAVENOUS at 07:48

## 2020-01-22 RX ADMIN — ONDANSETRON 4 MG: 2 INJECTION INTRAMUSCULAR; INTRAVENOUS at 07:15

## 2020-01-22 RX ADMIN — SODIUM CHLORIDE, PRESERVATIVE FREE 10 ML: 5 INJECTION INTRAVENOUS at 21:45

## 2020-01-22 RX ADMIN — ACETAMINOPHEN 650 MG: 325 TABLET, FILM COATED ORAL at 15:41

## 2020-01-22 ASSESSMENT — PAIN SCALES - GENERAL
PAINLEVEL_OUTOF10: 4
PAINLEVEL_OUTOF10: 10
PAINLEVEL_OUTOF10: 0
PAINLEVEL_OUTOF10: 9
PAINLEVEL_OUTOF10: 0

## 2020-01-22 ASSESSMENT — PAIN DESCRIPTION - PROGRESSION: CLINICAL_PROGRESSION: GRADUALLY WORSENING

## 2020-01-22 ASSESSMENT — PAIN DESCRIPTION - PAIN TYPE
TYPE: ACUTE PAIN
TYPE: ACUTE PAIN

## 2020-01-22 ASSESSMENT — PAIN DESCRIPTION - FREQUENCY
FREQUENCY: CONTINUOUS
FREQUENCY: CONTINUOUS

## 2020-01-22 ASSESSMENT — PAIN DESCRIPTION - DESCRIPTORS
DESCRIPTORS: SHARP
DESCRIPTORS: HEADACHE

## 2020-01-22 ASSESSMENT — PAIN DESCRIPTION - LOCATION
LOCATION: CHEST;JAW;BACK
LOCATION: HEAD

## 2020-01-22 ASSESSMENT — PAIN DESCRIPTION - ONSET: ONSET: ON-GOING

## 2020-01-22 ASSESSMENT — PAIN - FUNCTIONAL ASSESSMENT: PAIN_FUNCTIONAL_ASSESSMENT: ACTIVITIES ARE NOT PREVENTED

## 2020-01-22 NOTE — PROGRESS NOTES
was supine and agreeable to PT interventions. Pt's family was present. Pt c/o of chest pain and nausea that started earlier this morning but reports symptoms have improved. Pt was lethargic having recently been medicated. Pt performed supine to sit to EOB with increased time due to mild dizziness and pain. Pt HR ranged from 105-111 bpm throughout session. Pt performed STS and ambulated from bed into hallway and back to bed. Pt required HHA as ambulation progressed due to fatigue; pt was observed reaching for environment for support. Pt performed stand to sit on EOB and c/o nausea. Pt was given basin but nausea improved. Pt performed sit to supine and was left in this position with all needs met. Pts/family goals:  Return to PLOF. Patient and or family understand(s) diagnosis, prognosis, and plan of care:  Yes    PLAN  PT care will be provided in accordance with the objectives noted above. Whenever appropriate, clear delegation orders will be provided for nursing staff. Exercises and functional mobility practice will be used as well as appropriate assistive devices or modalities to obtain goals. Patient and family education will also be administered as needed. Frequency of treatments: 2-5x/week x 2-3 days.     Time in: 0635  Time out: [id number] [facility], PT, DPT  [id number]    Femi Villasenor, SPT

## 2020-01-22 NOTE — PLAN OF CARE
Problem: Pain Control  Goal: Pain control  Description  Patient will demonstrate personal actions to control pain.      Outcome: Met This Shift

## 2020-01-22 NOTE — ED NOTES
Pt is resting comfortably in room.  Family members at [address] [facility]  01/22/20 4350

## 2020-01-22 NOTE — ED PROVIDER NOTES
48.0 %    MCV 83.6 80.0 - 99.9 fL    MCH 27.3 26.0 - 35.0 pg    MCHC 32.6 32.0 - 34.5 %    RDW 12.3 11.5 - 15.0 fL    Platelets 480 614 - 607 E9/L    MPV 9.9 7.0 - 12.0 fL    Neutrophils % 89.5 (H) 43.0 - 80.0 %    Immature Granulocytes % 0.5 0.0 - 5.0 %    Lymphocytes % 3.5 (L) 20.0 - 42.0 %    Monocytes % 4.8 2.0 - 12.0 %    Eosinophils % 1.5 0.0 - 6.0 %    Basophils % 0.2 0.0 - 2.0 %    Neutrophils Absolute 15.03 (H) 1.80 - 7.30 E9/L    Immature Granulocytes # 0.08 E9/L    Lymphocytes Absolute 0.58 (L) 1.50 - 4.00 E9/L    Monocytes Absolute 0.81 0.10 - 0.95 E9/L    Eosinophils Absolute 0.26 0.05 - 0.50 E9/L    Basophils Absolute 0.04 0.00 - 0.20 E9/L   Comprehensive Metabolic Panel   Result Value Ref Range    Sodium 141 132 - 146 mmol/L    Potassium 3.7 3.5 - 5.0 mmol/L    Chloride 102 98 - 107 mmol/L    CO2 25 22 - 29 mmol/L    Anion Gap 14 7 - 16 mmol/L    Glucose 143 (H) 74 - 99 mg/dL    BUN 13 6 - 20 mg/dL    CREATININE 0.6 0.5 - 1.0 mg/dL    GFR Non-African American >60 >=60 mL/min/1.73    GFR African American >60     Calcium 9.6 8.6 - 10.2 mg/dL    Total Protein 7.5 6.4 - 8.3 g/dL    Alb 4.3 3.5 - 5.2 g/dL    Total Bilirubin 0.7 0.0 - 1.2 mg/dL    Alkaline Phosphatase 101 35 - 104 U/L    ALT 18 0 - 32 U/L    AST 17 0 - 31 U/L   APTT   Result Value Ref Range    aPTT 28.5 24.5 - 35.1 sec   Protime-INR   Result Value Ref Range    Protime 16.9 (H) 9.3 - 12.4 sec    INR 1.5    Troponin   Result Value Ref Range    Troponin <0.01 0.00 - 0.03 ng/mL   Lipase   Result Value Ref Range    Lipase 34 13 - 60 U/L   Urinalysis   Result Value Ref Range    Color, UA Yellow Straw/Yellow    Clarity, UA Clear Clear    Glucose, Ur Negative Negative mg/dL    Bilirubin Urine Negative Negative    Ketones, Urine Negative Negative mg/dL    Specific Gravity, UA 1.010 1.005 - 1.030    Blood, Urine TRACE-INTACT Negative    pH, UA 8.0 5.0 - 9.0    Protein, UA Negative Negative mg/dL    Urobilinogen, Urine 0.2 <2.0 E.U./dL    Nitrite, Urine Negative Negative    Leukocyte Esterase, Urine TRACE (A) Negative   Microscopic Urinalysis   Result Value Ref Range    WBC, UA 2-5 0 - 5 /HPF    RBC, UA 2-5 0 - 2 /HPF    Bacteria, UA RARE (A) /HPF   Troponin   Result Value Ref Range    Troponin <0.01 0.00 - 0.03 ng/mL   Troponin   Result Value Ref Range    Troponin <0.01 0.00 - 0.03 ng/mL   LIPID PANEL   Result Value Ref Range    Cholesterol, Total 237 (H) 0 - 199 mg/dL    Triglycerides 80 0 - 149 mg/dL    HDL 69 >40 mg/dL    LDL Calculated 152 (H) 0 - 99 mg/dL    VLDL Cholesterol Calculated 16 mg/dL   HCG Qualitative, Serum   Result Value Ref Range    hCG Qual NEGATIVE NEGATIVE   C-reactive protein   Result Value Ref Range    CRP 5.3 (H) 0.0 - 0.4 mg/dL   Sedimentation Rate   Result Value Ref Range    Sed Rate 3 0 - 20 mm/Hr   Procalcitonin   Result Value Ref Range    Procalcitonin 0.73 (H) 0.00 - 0.08 ng/mL   CBC   Result Value Ref Range    WBC 15.4 (H) 4.5 - 11.5 E9/L    RBC 5.25 3.50 - 5.50 E12/L    Hemoglobin 13.8 11.5 - 15.5 g/dL    Hematocrit 43.8 34.0 - 48.0 %    MCV 83.4 80.0 - 99.9 fL    MCH 26.3 26.0 - 35.0 pg    MCHC 31.5 (L) 32.0 - 34.5 %    RDW 12.6 11.5 - 15.0 fL    Platelets 085 029 - 907 E9/L    MPV 10.0 7.0 - 12.0 fL   Basic Metabolic Panel w/ Reflex to MG   Result Value Ref Range    Sodium 137 132 - 146 mmol/L    Potassium reflex Magnesium 3.7 3.5 - 5.0 mmol/L    Chloride 101 98 - 107 mmol/L    CO2 20 (L) 22 - 29 mmol/L    Anion Gap 16 7 - 16 mmol/L    Glucose 169 (H) 74 - 99 mg/dL    BUN 13 6 - 20 mg/dL    CREATININE 0.6 0.5 - 1.0 mg/dL    GFR Non-African American >60 >=60 mL/min/1.73    GFR African American >60     Calcium 9.3 8.6 - 10.2 mg/dL   EKG 12 Lead   Result Value Ref Range    Ventricular Rate 99 BPM    Atrial Rate 99 BPM    P-R Interval 162 ms    QRS Duration 84 ms    Q-T Interval 334 ms    QTc Calculation (Bazett) 428 ms    P Axis 42 degrees    R Axis -7 degrees    T Axis 31 degrees       RADIOLOGY:  Interpreted by addition to providing specific details for the plan of care and counseling regarding the diagnosis and prognosis. Questions are answered at this time and they are agreeable with the plan.      --------------------------------- IMPRESSION AND DISPOSITION ---------------------------------    IMPRESSION  No diagnosis found.     DISPOSITION  Disposition: Admit to telemetry  Patient condition is serious                  Rosa Fang MD  01/24/20 0502

## 2020-01-22 NOTE — CONSULTS
Binh Garcia M.D.,[facility]  Rosy Lugo D.O., F.A.C.O.I., Noé Collazo M.D. Sarabjit Bolton M.D., Drea Platt M.D. Dr. Tahmina Horton DO      Patient:  John Rae 49 y.o. female MRN: [id number]     Date of Service: 1/22/2020      PULMONARY CONSULTATION    Reason for Consultation: abnormal chest CT- multiple pulmonary nodules and lymphadenopathy  Referring Physician:     Communication with the referring physician will be sent via the electronic medical record. Chief Complaint: Cough, shortness of breath    CODE STATUS: Full    SUBJECTIVE:  HPI:  John Rae is a 49 y.o. female who presented to the ED 1 day ago for chest pain that began 1 day prior. The complaint has been persistent, moderate in severity and worsened by nothing. Patient complained that chest pain radiated to her back, in addition to nausea. Onset was sudden. Also had syncopal episode. She is a non-smoker. In ED CTA of chest was done that showed mediastinal and hilar lymphadenopathy and multiple bilateral pulmonary nodules, no PE. We have been consulted for abnormal CTA of chest.  Patient seen in room she appears in no acute distress and she has no labored breathing. Patient reports that symptoms of shortness of breath started with  new onset chest pain radiating to back that started suddenly over the last 24 hours prior to admission. She also reports having chest heaviness. She does have a bronchodilator for rescue, she did use this x1, reports she did have some relief. She denies sick contacts. She does report possible fever although she had no thermometer, chills, and sweats over the last 24 hours. Patient does report to me that she was seen on 110 by her primary care physician for fatigue headache and swollen glands. She did have a positive Diego-Carreon test recently on 1/10/2020. Was treated with Amoxil x1 week and prednisone.   She was seen 5 years ago by Dr. Jose Valenzuela  for asthma she did ampule Inhalation Q4H WA    methylPREDNISolone  40 mg Intravenous Q8H       Continuous Infusions:   sodium chloride 75 mL/hr at 01/22/20 1537       Allergies   Allergen Reactions    Codeine Other (See Comments)     Heart raced, felt hot    Morphine Nausea Only    Demerol Hcl [Meperidine] Palpitations       REVIEW OF SYSTEMS:  Constitutional: +fever, weight loss, night sweats, and+ fatigue  Skin: Denies pigmentation, dark lesions, and rashes   HEENT: Denies hearing loss, tinnitus, ear drainage, epistaxis, sore throat, and hoarseness. Cardiovascular: Denies palpitations,+ chest pain, and chest heaviness. Respiratory: +cough, +dyspnea at rest, hemoptysis, apnea, and choking. Gastrointestinal: Denies nausea, vomiting, poor appetite, diarrhea, heartburn or reflux  Genitourinary: Denies dysuria, frequency, urgency or hematuria  Musculoskeletal: Denies myalgias, muscle weakness, and bone pain  Neurological: Denies dizziness, vertigo, headache, and focal weakness  Psychological: Denies anxiety and depression  Endocrine: Denies heat intolerance and cold intolerance  Hematopoietic/Lymphatic: Denies bleeding problems and blood transfusions    OBJECTIVE:   /74   Pulse 99   Temp 99.8 °F (37.7 °C) (Temporal)   Resp 18   Ht 5' 2\" (1.575 m)   Wt 181 lb (82.1 kg)   LMP 05/31/2017   SpO2 96%   BMI 33.11 kg/m²   SpO2 Readings from Last 1 Encounters:   01/22/20 96%        I/O:    Intake/Output Summary (Last 24 hours) at 1/22/2020 1558  Last data filed at 1/22/2020 1535  Gross per 24 hour   Intake 240 ml   Output 0 ml   Net 240 ml                      Physical Exam:  General: The patient is lying in bed comfortably without any distress.   Breathing is not labored  HEENT: Pupils are equal round and reactive to light, there are no oral lesions and no post-nasal drip   Neck: supple without adenopathy  Cardiovascular: regular rate and rhythm with murmur or gallop  Respiratory: Decreased air movement, end expiratory no prolapse  7. Hx eosinophilia  8. 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 Start Solumedrol 40 mg Q 8 hours, taper with improvement  2. DuoNeb every 4 hours while awake  3.  start zithromycin   4. Cardiology consulted -for stress test in the a.m.  5. Will check respiratory panel, inflammatory markers, ace  6. Will need reimaging in 6-8 weeks for resolution of pulmonary nodules and lymphadenopathy, if no resolution will need EBUS  7. Will add PPI      Thank you for allowing me to participate in the care of Cristóbal Méndez. Please feel free to call with questions. This plan of care was reviewed in collaboration with Dr. Kim Bird      Electronically signed by MISSY Kohler on 1/22/2020 at 3:58 PM     I personally saw, examined, and cared for the patient. Labs, medications, radiographs reviewed. I agree with history exam and plans detailed in NP note. Ct chest is concerning for infection with underlying granulomatous lung disease. Asthma maybe a result of underlying sarcoidosis. Check ace level, crp and esr, and urine histo antigen. Steroid, lauren Bird M.D.    Pulmonary/Critical Care Medicine

## 2020-01-23 ENCOUNTER — APPOINTMENT (OUTPATIENT)
Dept: NON INVASIVE DIAGNOSTICS | Age: 55
DRG: 313 | End: 2020-01-23
Payer: COMMERCIAL

## 2020-01-23 ENCOUNTER — APPOINTMENT (OUTPATIENT)
Dept: NUCLEAR MEDICINE | Age: 55
DRG: 313 | End: 2020-01-23
Payer: COMMERCIAL

## 2020-01-23 VITALS
WEIGHT: 179.5 LBS | TEMPERATURE: 98.9 F | RESPIRATION RATE: 16 BRPM | OXYGEN SATURATION: 94 % | HEIGHT: 62 IN | DIASTOLIC BLOOD PRESSURE: 82 MMHG | SYSTOLIC BLOOD PRESSURE: 140 MMHG | HEART RATE: 88 BPM | BODY MASS INDEX: 33.03 KG/M2

## 2020-01-23 PROBLEM — R07.9 CHEST PAIN: Status: ACTIVE | Noted: 2020-01-23

## 2020-01-23 PROBLEM — R07.9 CHEST PAIN: Status: RESOLVED | Noted: 2020-01-22 | Resolved: 2020-01-23

## 2020-01-23 PROBLEM — R55 SYNCOPE: Status: RESOLVED | Noted: 2020-01-22 | Resolved: 2020-01-23

## 2020-01-23 PROBLEM — J45.30 MILD PERSISTENT ASTHMA: Chronic | Status: ACTIVE | Noted: 2020-01-23

## 2020-01-23 PROBLEM — E03.9 ACQUIRED HYPOTHYROIDISM: Chronic | Status: ACTIVE | Noted: 2020-01-23

## 2020-01-23 PROBLEM — R91.8 PULMONARY NODULES: Status: ACTIVE | Noted: 2020-01-23

## 2020-01-23 PROBLEM — R93.89 ABNORMAL CT OF THE CHEST: Status: RESOLVED | Noted: 2020-01-22 | Resolved: 2020-01-23

## 2020-01-23 LAB
ANION GAP SERPL CALCULATED.3IONS-SCNC: 16 MMOL/L (ref 7–16)
BUN BLDV-MCNC: 13 MG/DL (ref 6–20)
CALCIUM SERPL-MCNC: 9.3 MG/DL (ref 8.6–10.2)
CHLORIDE BLD-SCNC: 101 MMOL/L (ref 98–107)
CO2: 20 MMOL/L (ref 22–29)
CREAT SERPL-MCNC: 0.6 MG/DL (ref 0.5–1)
GFR AFRICAN AMERICAN: >60
GFR NON-AFRICAN AMERICAN: >60 ML/MIN/1.73
GLUCOSE BLD-MCNC: 169 MG/DL (ref 74–99)
HCT VFR BLD CALC: 43.8 % (ref 34–48)
HEMOGLOBIN: 13.8 G/DL (ref 11.5–15.5)
L. PNEUMOPHILA SEROGP 1 UR AG: NORMAL
LV EF: 55 %
LV EF: 70 %
LVEF MODALITY: NORMAL
LVEF MODALITY: NORMAL
MCH RBC QN AUTO: 26.3 PG (ref 26–35)
MCHC RBC AUTO-ENTMCNC: 31.5 % (ref 32–34.5)
MCV RBC AUTO: 83.4 FL (ref 80–99.9)
PDW BLD-RTO: 12.6 FL (ref 11.5–15)
PLATELET # BLD: 357 E9/L (ref 130–450)
PMV BLD AUTO: 10 FL (ref 7–12)
POTASSIUM REFLEX MAGNESIUM: 3.7 MMOL/L (ref 3.5–5)
RBC # BLD: 5.25 E12/L (ref 3.5–5.5)
SODIUM BLD-SCNC: 137 MMOL/L (ref 132–146)
STREP PNEUMONIAE ANTIGEN, URINE: NORMAL
WBC # BLD: 15.4 E9/L (ref 4.5–11.5)

## 2020-01-23 PROCEDURE — G0378 HOSPITAL OBSERVATION PER HR: HCPCS

## 2020-01-23 PROCEDURE — 96376 TX/PRO/DX INJ SAME DRUG ADON: CPT

## 2020-01-23 PROCEDURE — 6370000000 HC RX 637 (ALT 250 FOR IP): Performed by: PHYSICIAN ASSISTANT

## 2020-01-23 PROCEDURE — 78452 HT MUSCLE IMAGE SPECT MULT: CPT

## 2020-01-23 PROCEDURE — A9500 TC99M SESTAMIBI: HCPCS | Performed by: RADIOLOGY

## 2020-01-23 PROCEDURE — 93018 CV STRESS TEST I&R ONLY: CPT | Performed by: INTERNAL MEDICINE

## 2020-01-23 PROCEDURE — 93017 CV STRESS TEST TRACING ONLY: CPT

## 2020-01-23 PROCEDURE — 3430000000 HC RX DIAGNOSTIC RADIOPHARMACEUTICAL: Performed by: RADIOLOGY

## 2020-01-23 PROCEDURE — 85027 COMPLETE CBC AUTOMATED: CPT

## 2020-01-23 PROCEDURE — 94640 AIRWAY INHALATION TREATMENT: CPT

## 2020-01-23 PROCEDURE — 6360000002 HC RX W HCPCS: Performed by: PHYSICIAN ASSISTANT

## 2020-01-23 PROCEDURE — 6370000000 HC RX 637 (ALT 250 FOR IP): Performed by: CLINICAL NURSE SPECIALIST

## 2020-01-23 PROCEDURE — 2580000003 HC RX 258: Performed by: INTERNAL MEDICINE

## 2020-01-23 PROCEDURE — 93306 TTE W/DOPPLER COMPLETE: CPT

## 2020-01-23 PROCEDURE — 99231 SBSQ HOSP IP/OBS SF/LOW 25: CPT | Performed by: INTERNAL MEDICINE

## 2020-01-23 PROCEDURE — 36415 COLL VENOUS BLD VENIPUNCTURE: CPT

## 2020-01-23 PROCEDURE — 2580000003 HC RX 258: Performed by: PHYSICIAN ASSISTANT

## 2020-01-23 PROCEDURE — 6360000002 HC RX W HCPCS: Performed by: INTERNAL MEDICINE

## 2020-01-23 PROCEDURE — 80048 BASIC METABOLIC PNL TOTAL CA: CPT

## 2020-01-23 PROCEDURE — 93016 CV STRESS TEST SUPVJ ONLY: CPT | Performed by: INTERNAL MEDICINE

## 2020-01-23 RX ORDER — PREDNISONE 10 MG/1
10 TABLET ORAL DAILY
Status: DISCONTINUED | OUTPATIENT
Start: 2020-02-02 | End: 2020-01-23 | Stop reason: HOSPADM

## 2020-01-23 RX ORDER — PREDNISONE 10 MG/1
TABLET ORAL
Qty: 20 TABLET | Refills: 0 | Status: SHIPPED | OUTPATIENT
Start: 2020-01-23 | End: 2020-03-16

## 2020-01-23 RX ORDER — PREDNISONE 20 MG/1
20 TABLET ORAL DAILY
Status: DISCONTINUED | OUTPATIENT
Start: 2020-01-30 | End: 2020-01-23 | Stop reason: HOSPADM

## 2020-01-23 RX ORDER — AZITHROMYCIN 500 MG/1
500 TABLET, FILM COATED ORAL DAILY
Qty: 1 PACKET | Refills: 0 | Status: SHIPPED | OUTPATIENT
Start: 2020-01-23 | End: 2020-01-26

## 2020-01-23 RX ORDER — METHYLPREDNISOLONE SODIUM SUCCINATE 40 MG/ML
40 INJECTION, POWDER, LYOPHILIZED, FOR SOLUTION INTRAMUSCULAR; INTRAVENOUS EVERY 12 HOURS
Status: DISCONTINUED | OUTPATIENT
Start: 2020-01-23 | End: 2020-01-23

## 2020-01-23 RX ORDER — PREDNISONE 20 MG/1
40 TABLET ORAL DAILY
Status: DISCONTINUED | OUTPATIENT
Start: 2020-01-24 | End: 2020-01-23 | Stop reason: HOSPADM

## 2020-01-23 RX ADMIN — Medication 10 MILLICURIE: at 08:58

## 2020-01-23 RX ADMIN — METHYLPREDNISOLONE SODIUM SUCCINATE 40 MG: 40 INJECTION, POWDER, FOR SOLUTION INTRAMUSCULAR; INTRAVENOUS at 11:20

## 2020-01-23 RX ADMIN — IPRATROPIUM BROMIDE AND ALBUTEROL SULFATE 1 AMPULE: 2.5; .5 SOLUTION RESPIRATORY (INHALATION) at 08:19

## 2020-01-23 RX ADMIN — NITROFURANTOIN MONOHYDRATE/MACROCRYSTALLINE 100 MG: 25; 75 CAPSULE ORAL at 11:20

## 2020-01-23 RX ADMIN — ASPIRIN 81 MG CHEWABLE TABLET 81 MG: 81 TABLET CHEWABLE at 11:19

## 2020-01-23 RX ADMIN — SODIUM CHLORIDE: 9 INJECTION, SOLUTION INTRAVENOUS at 03:05

## 2020-01-23 RX ADMIN — PANTOPRAZOLE SODIUM 40 MG: 40 TABLET, DELAYED RELEASE ORAL at 03:04

## 2020-01-23 RX ADMIN — SODIUM CHLORIDE, PRESERVATIVE FREE 10 ML: 5 INJECTION INTRAVENOUS at 11:20

## 2020-01-23 RX ADMIN — REGADENOSON 0.4 MG: 0.08 INJECTION, SOLUTION INTRAVENOUS at 10:06

## 2020-01-23 RX ADMIN — ACETAMINOPHEN 650 MG: 325 TABLET, FILM COATED ORAL at 03:04

## 2020-01-23 RX ADMIN — ONDANSETRON 4 MG: 2 INJECTION INTRAMUSCULAR; INTRAVENOUS at 10:20

## 2020-01-23 RX ADMIN — ACETAMINOPHEN 650 MG: 325 TABLET, FILM COATED ORAL at 11:20

## 2020-01-23 RX ADMIN — Medication 35 MILLICURIE: at 10:58

## 2020-01-23 ASSESSMENT — PAIN SCALES - GENERAL
PAINLEVEL_OUTOF10: 10
PAINLEVEL_OUTOF10: 0
PAINLEVEL_OUTOF10: 0
PAINLEVEL_OUTOF10: 2
PAINLEVEL_OUTOF10: 5
PAINLEVEL_OUTOF10: 0

## 2020-01-23 ASSESSMENT — PAIN DESCRIPTION - LOCATION: LOCATION: HEAD

## 2020-01-23 ASSESSMENT — PAIN DESCRIPTION - FREQUENCY: FREQUENCY: CONTINUOUS

## 2020-01-23 ASSESSMENT — PAIN DESCRIPTION - DESCRIPTORS: DESCRIPTORS: HEADACHE

## 2020-01-23 ASSESSMENT — PAIN DESCRIPTION - PROGRESSION: CLINICAL_PROGRESSION: GRADUALLY WORSENING

## 2020-01-23 ASSESSMENT — PAIN DESCRIPTION - ONSET: ONSET: ON-GOING

## 2020-01-23 ASSESSMENT — PAIN DESCRIPTION - PAIN TYPE: TYPE: ACUTE PAIN

## 2020-01-23 NOTE — PROGRESS NOTES
CLINICAL PHARMACY: DISCHARGE MED RECONCILIATION/REVIEW    [facility]) Select Patient?: Yes  Total # of Interventions Recommended: 0   -   Total # Interventions Accepted: 0  Intervention Severity:   - Level 1 Intervention Present?: No   - Level 2 #: 0   - Level 3 #: 0   Time Spent (min): 15    Additional Documentation:  See progress note

## 2020-01-23 NOTE — H&P
that she has never smoked. She has never used smokeless tobacco. She reports that she does not drink alcohol or use drugs. Family History:   family history includes COPD in her brother; Diabetes in her father; Heart Failure in her father; High Blood Pressure in her mother; Pacemaker in her father; Stroke in her father. REVIEW OF SYSTEMS:  As above in the HPI, otherwise negative    PHYSICAL EXAM:    Vitals:  /77   Pulse 79   Temp 98.8 °F (37.1 °C) (Temporal)   Resp 16   Ht 5' 2\" (1.575 m)   Wt 179 lb 8 oz (81.4 kg)   LMP 05/31/2017   SpO2 95%   BMI 32.83 kg/m²     General:  Awake, alert, oriented X 3. Well developed, well nourished, well groomed. No apparent distress. HEENT:  Normocephalic, atraumatic. Pupils equal, round, reactive to light. No scleral icterus. No conjunctival injection. Normal lips, teeth, and gums. No nasal discharge. Neck:  Supple  Heart:  RRR, no murmurs, gallops, rubs  Lungs:  CTA bilaterally, bilat symmetrical expansion, no wheeze, rales, or rhonchi  Abdomen:   Bowel sounds present, soft, nontender, no masses, no organomegaly, no peritoneal signs  Extremities:  No clubbing, cyanosis, or edema  Skin:  Warm and dry, no open lesions or rash  Neuro:  Cranial nerves 2-12 intact, no focal deficits  Breast: deferred  Rectal: deferred  Genitalia:  deferred    LABS:    CBC with Differential:    Lab Results   Component Value Date    WBC 15.4 01/23/2020    RBC 5.25 01/23/2020    HGB 13.8 01/23/2020    HCT 43.8 01/23/2020     01/23/2020    MCV 83.4 01/23/2020    MCH 26.3 01/23/2020    MCHC 31.5 01/23/2020    RDW 12.6 01/23/2020    BANDSPCT 1 03/02/2016    LYMPHOPCT 3.5 01/22/2020    MONOPCT 4.8 01/22/2020    BASOPCT 0.2 01/22/2020    MONOSABS 0.81 01/22/2020    LYMPHSABS 0.58 01/22/2020    EOSABS 0.26 01/22/2020    BASOSABS 0.04 01/22/2020     CMP:    Lab Results   Component Value Date     01/23/2020    K 3.7 01/23/2020     01/23/2020    CO2 20 01/23/2020

## 2020-01-23 NOTE — PROGRESS NOTES
WA  azithromycin (ZITHROMAX) 500 mg in D5W 250ml Vial Mate, 500 mg, Intravenous, Q24H  pantoprazole (PROTONIX) tablet 40 mg, 40 mg, Oral, QAM AC  Physical    TEMPERATURE:  Current - Temp: 98.8 °F (37.1 °C);  Max - Temp  Av.3 °F (37.4 °C)  Min: 97.8 °F (36.6 °C)  Max: 101.3 °F (38.5 °C)  RESPIRATIONS RANGE: Resp  Av  Min: 16  Max: 18  PULSE RANGE: Pulse  Av.5  Min: 79  Max: 92  BLOOD PRESSURE RANGE:  Systolic (90DEV), WGF:728 , Min:121 , AUN:453   ; Diastolic (96TRY), TJH:08, Min:71, Max:77    PULSE OXIMETRY RANGE: SpO2  Av.7 %  Min: 95 %  Max: 96 %  24HR INTAKE/OUTPUT:    Intake/Output Summary (Last 24 hours) at 2020 1546  Last data filed at 2020 0819  Gross per 24 hour   Intake 1741 ml   Output 800 ml   Net 941 ml     CONSTITUTIONAL:  awake, alert, cooperative, no apparent distress, and appears stated age  NECK:  Supple, symmetrical, trachea midline, no adenopathy, thyroid symmetric, not enlarged and no tenderness, skin normal  HEMATOLOGIC/LYMPHATICS:  no cervical lymphadenopathy and no supraclavicular lymphadenopathy  BACK:  Symmetric, no curvature, spinous processes are non-tender on palpation, paraspinous muscles are non-tender on palpation, no costal vertebral tenderness  LUNGS:  No increased work of breathing, good air exchange, clear to auscultation bilaterally, no crackles or wheezing  CARDIOVASCULAR:  Normal apical impulse, regular rate and rhythm, normal S1 and S2, no S3 or S4, and no murmur noted  ABDOMEN:  No scars, normal bowel sounds, soft, non-distended, non-tender, no masses palpated, no hepatosplenomegally  Data    CBC with Differential:    Lab Results   Component Value Date    WBC 15.4 2020    RBC 5.25 2020    HGB 13.8 2020    HCT 43.8 2020     2020    MCV 83.4 2020    MCH 26.3 2020    MCHC 31.5 2020    RDW 12.6 2020    BANDSPCT 1 2016    LYMPHOPCT 3.5 2020    MONOPCT 4.8 2020    BASOPCT 0.2

## 2020-01-23 NOTE — PROGRESS NOTES
Message sent to Dr. Francisca Carl via perfect serve regarding if patient is ok to discharge. Stress test negative.    Ariela Lowery

## 2020-01-23 NOTE — PROGRESS NOTES
[address]   Department of Pharmacy   Pharmacist Transition of Care Services         Patient Demographics  Name:  John Rae   Medical Record Number:  [id number]  Gender:  female   Age:  49 y.o. YOB: 1965    Primary Care Physician: Yolanda Nguyen DO  Primary Care Physician phone number:  [phone]  Readmission Risk (% from EPIC Patient List): 10%     Patient plans to participate in [facility] to [facility] (Y/N): Y    Pharmacist Review and Summary of Medications     Date of last reviewed/update: 1/23/20     Category Comments   New Medication Started   1. Azithromycin 500 mg daily x 3 days  2. Prednisone taper         Change in Outpatient Medication  (Dosage Form, Route,   Dose, or Frequency)          Discontinued Outpatient Medication   (or on Hold During Admission)          Other              Pharmacist Patient Education:    Date  Person Educated Content of Education                 Documentation of Pharmacist Interventions and Follow-up Plan:     The following Pharmacist Transition of Care Services were completed:   [x]  Reviewed and summarized medication changes  [x]  Entire home medication list was reviewed for accuracy (sources: patient, [employer])  []  Home medication list was updated or corrected     [x]  Reviewed discharge medication reconciliation  []  Discharge medication list was updated or corrected  []  Patient education was provided on new medications  []  Patient education was provided on medication changes  []  Reviewed the After Visit Summary (AVS) with patient    Additional Interventions:  []  Inpatient prescriber was contacted and the following pharmacy recommendations        were accepted: **     [] Other interventions: **        Pharmacist: Mendel Binet PharmD, [facility]  Date:  1/23/2020 9:33 AM  Time spent counseling on medications: 0 minutes

## 2020-01-23 NOTE — PROGRESS NOTES
Spoke with Dr. Ita Harden regarding pulmonology is not ok with discharge. Dr. Ita Harden states as long as the stress test and echocardiogram is normal patient is to discharge.    Harlan Aquino

## 2020-01-23 NOTE — PROGRESS NOTES
Pt off unit for stress test , will f/u when she returns   Electronically signed by MISSY Werner on 1/23/2020 at 11:26 AM

## 2020-01-23 NOTE — CONSULTS
Nightly, Steve Hackettill [place], [place]    [START ON 1/23/2020] aspirin chewable tablet 81 mg, 81 mg, Oral, Daily, KENA Beckett    enoxaparin (LOVENOX) injection 40 mg, 40 mg, Subcutaneous, Daily, KENA Beckett, 40 mg at 01/22/20 1453    nitroGLYCERIN (NITROSTAT) SL tablet 0.4 mg, 0.4 mg, Sublingual, Q5 Min PRN, KENA Beckett    perflutren lipid microspheres (DEFINITY) injection 1.65 mg, 1.5 mL, Intravenous, ONCE PRN, KENA Bolaños    regadenoson (LEXISCAN) injection 0.4 mg, 0.4 mg, Intravenous, ONCE PRN, KENA Beckett    0.9 % sodium chloride infusion, , Intravenous, Continuous, Lily Kwong MD, Last Rate: 75 mL/hr at 01/22/20 1537    potassium chloride (KLOR-CON M) extended release tablet 40 mEq, 40 mEq, Oral, PRN **OR** potassium bicarb-citric acid (EFFER-K) effervescent tablet 40 mEq, 40 mEq, Oral, PRN **OR** potassium chloride 10 mEq/100 mL IVPB (Peripheral Line), 10 mEq, Intravenous, PRN, KENA Bolaños    acetaminophen (TYLENOL) tablet 650 mg, 650 mg, Oral, Q4H PRN, KENA Bolaños, 650 mg at 01/22/20 1541    ipratropium-albuterol (DUONEB) nebulizer solution 1 ampule, 1 ampule, Inhalation, Q4H WA, KENA Beckett, 1 ampule at 01/22/20 1613    methylPREDNISolone sodium (SOLU-MEDROL) injection 40 mg, 40 mg, Intravenous, Q8H, Ana Carey MD, 40 mg at 01/22/20 1541    azithromycin (ZITHROMAX) 500 mg in D5W 250ml Vial Mate, 500 mg, Intravenous, Q24H, MISSY Minor, Stopped at 01/22/20 1755    [START ON 1/23/2020] pantoprazole (PROTONIX) tablet 40 mg, 40 mg, Oral, QAM AC, Rachel Chavarria MISSY    Allergies as of 01/22/2020 - Review Complete 01/22/2020   Allergen Reaction Noted    Codeine Other (See Comments) 01/18/2016    Morphine Nausea Only 01/18/2016    Demerol hcl [meperidine] Palpitations 02/15/2016       Social History     Socioeconomic History    Marital status:      Spouse name: Not on file    Number of children: 4    Years of hesitancy  HEMATOLOGIC/LYMPHATIC:  negative for easy bruising, bleeding, lymphadenopathy and petechiae  ALLERGIC/IMMUNOLOGIC:  negative for urticaria, hay fever and angioedema  ENDOCRINE:  negative for heat intolerance, cold intolerance, tremor, hair loss and diabetic symptoms including neither polyuria nor polydipsia nor blurred vision  MUSCULOSKELETAL:  negative for  myalgias, arthralgias, joint swelling, stiff joints and decreased range of motion  NEUROLOGICAL:  negative for memory problems, speech problems, visual disturbance, dysphagia, weakness and numbness      PHYSICAL EXAM:   CONSTITUTIONAL:  awake, alert, cooperative, no apparent distress, and appears stated age  EYES:  lids and lashes normal and pupils equal, round and reactive to light, anicteric sclerae  HEAD:  normocepalic, without obvious abnormality, atraumatic, pink, moist mucous membranes. NECK:  Supple, symmetrical, trachea midline, no adenopathy, thyroid symmetric, not enlarged and no tenderness, skin normal  HEMATOLOGIC/LYMPHATICS:  no cervical lymphadenopathy and no supraclavicular lymphadenopathy  LUNGS:  No increased work of breathing, good air exchange, clear to auscultation bilaterally, no crackles or wheezing  CARDIOVASCULAR:  Normal apical impulse, regular rate and rhythm, normal S1 and S2, no S3 or S4, 2/6 systolic murmur at the apex, 2/6 systolic ejection murmur at the right upper sternal border, no JVD, no carotid bruit, no pedal edema, good carotid upstroke bilaterally. ABDOMEN:  Soft, nontender, no masses, no hepatomegaly or splenomegaly, BS+  CHEST: nontender to palpation, expands symmetrically  MUSCULOSKELETAL:  No clubbing no cyanosis. there is no redness, warmth, or swelling of the joints  full range of motion noted  NEUROLOGIC:  Alert, awake,oriented x3, no focal neurologic deficit was appreciated  SKIN:  no bruising or bleeding, normal skin color, texture, turgor and no redness, warmth, or swelling        /74   Pulse 99 CT at 3-6 months, then                 CT   at 3-6 months, then                                             consider CT at 18-24 months             at 18-24 months      >8mm (>0.25 ml)               CT at 3-6 months, then                    CT at 3-6 months, then                                            consider CT at 18-24 months               at 18-24 months      SUBSOLID NODULES - Ground Glass      Nodule size (mm)*                Nodule Type                                Recommendation   __________________________________________________________________      <6 mm (<0.1 ml)               Single:  Ground Glass             No   routine follow up                                                                                             Single:  Part Solid                     No routine follow up                                                   Multiple                                 Ct at 3-6 months. If stable                                                                                              consider CT at 6-12 months      >6 mm (>0.1 ml)             Single:  Ground Glass              CT at   6-12 months                                                  Single:  Part Solid                 CT at 3-6 months to confirm                                                                                             persistence. If unchanged                                                                                            and solid component remains                                                                                            <6 mm, annual CT should be                                                                                            performed for 5 years                                                   Multiple                                    CT at 3-6 months.                                                                                                  Subsequent

## 2020-01-23 NOTE — PROGRESS NOTES
Patient's pulse ox on room air 98%. Patient ambulated independently earlier this shift on room air, pulse ox remained above 95%.

## 2020-01-24 ENCOUNTER — CARE COORDINATION (OUTPATIENT)
Dept: CASE MANAGEMENT | Age: 55
End: 2020-01-24

## 2020-01-24 LAB
HISTOPLASMA ANTIGEN URINE INTERP: NOT DETECTED
HISTOPLASMA ANTIGEN URINE: NOT DETECTED NG/ML

## 2020-01-24 NOTE — PROGRESS NOTES
soft tissue                                                                                                            sampling      MULTIPLE SOLID NODULES      Nodule size                         Low-Risk Patient (t)                       High-Risk Patient (#)          (mm)*   __________________________________________________________________                                         <6 mm (<0.1 ml)              No routine follow up                      Optional CT at 12 months         6-8 mm (0.1-0.25 ml)       CT at 3-6 months, then                 CT   at 3-6 months, then                                             consider CT at 18-24 months             at 18-24 months      >8mm (>0.25 ml)               CT at 3-6 months, then                    CT at 3-6 months, then                                            consider CT at 18-24 months               at 18-24 months      SUBSOLID NODULES - Ground Glass      Nodule size (mm)*                Nodule Type                                Recommendation   __________________________________________________________________      <6 mm (<0.1 ml)               Single:  Ground Glass             No   routine follow up                                                                                             Single:  Part Solid                     No routine follow up                                                   Multiple                                 Ct at 3-6 months. If stable                                                                                              consider CT at 6-12 months      >6 mm (>0.1 ml)             Single:  Ground Glass              CT at   6-12 months                                                  Single:  Part Solid                 CT at 3-6 months to confirm                                                                                             persistence.   If unchanged on 1/23/2020 at 9:19 PM  NOTE: This report was transcribed using voice recognition software.  Every effort was made to ensure accuracy; however, inadvertent computerized transcription errors may be present

## 2020-01-24 NOTE — CARE COORDINATION
[facility] Transitions Initial Follow Up Call    Call within 2 business days of discharge: Yes    Patient: Yani Pantoja Patient : 1965   MRN: [id number]  Reason for Admission: chest pain  Discharge Date: 20 RARS: Readmission Risk Score: 10      Last Discharge [facility]       Complaint Diagnosis Description Type Department Provider    20 Chest Pain; Dizziness  ED to Hosp-Admission (Discharged) (ADMITTED) [facility] [address] [facility] Eleanor Warren MD; Sadaf [place]. Spoke with: Yani Pantoja, patient    [place] [facility] [place]    Non-face-to-face services provided:  Scheduled appointment with PCP-patient to call   Scheduled appointment with Specialist-patient to call pulmonary for 4 week follow up as instructed. Patient states she was instructed cardiology appointment only if needed. Obtained and reviewed discharge summary and/or continuity of care documents    Care Transitions 24 Hour Call    Do you have any ongoing symptoms?:  No  Do you have a copy of your discharge instructions?:  Yes  Do you have all of your prescriptions and are they filled?:  Yes  Have you been contacted by a [address]?:  No  Have you scheduled your follow up appointment?:  No  Were you discharged with any Home Care or Post Acute Services:  No  Do you feel like you have everything you need to keep you well at home?:  Yes  Care Transitions Interventions  No Identified Needs                               -Spoke with patient Terrance Kwong  for [address] associate  care transition call post hospital discharge. --Patient admitted to [facility] ([facility] on 20  for chest pain with symptoms of pain in middle of chest  radiating to back and intermittent SOB. Patient states symptoms have resolved and she is just a little weak. -Allergies and med review  completed, 1111F  entered. Patient has her new medications of zithromax and prednisone and is aware to complete full course of therapies.   Patient states she has Breo available if

## 2020-01-25 LAB — ANGIOTENSIN CONVERTING ENZYME: 32 U/L (ref 9–67)

## 2020-01-31 ENCOUNTER — CARE COORDINATION (OUTPATIENT)
Dept: OTHER | Facility: CLINIC | Age: 55
End: 2020-01-31

## 2020-01-31 ENCOUNTER — TELEPHONE (OUTPATIENT)
Dept: PRIMARY CARE CLINIC | Age: 55
End: 2020-01-31

## 2020-01-31 RX ORDER — VALACYCLOVIR HYDROCHLORIDE 1 G/1
2000 TABLET, FILM COATED ORAL 2 TIMES DAILY
Qty: 4 TABLET | Refills: 0 | Status: SHIPPED
Start: 2020-01-31 | End: 2020-03-16 | Stop reason: ALTCHOICE

## 2020-01-31 NOTE — CARE COORDINATION
DO BOBBY Hdez [facility]   3/4/2020  1:30 PM Louvella Romberg, MD [facility]       Rafi Menchaca RN

## 2020-02-10 ENCOUNTER — OFFICE VISIT (OUTPATIENT)
Dept: PRIMARY CARE CLINIC | Age: 55
End: 2020-02-10
Payer: COMMERCIAL

## 2020-02-10 VITALS
TEMPERATURE: 98.1 F | WEIGHT: 186 LBS | RESPIRATION RATE: 18 BRPM | OXYGEN SATURATION: 97 % | SYSTOLIC BLOOD PRESSURE: 132 MMHG | DIASTOLIC BLOOD PRESSURE: 80 MMHG | HEART RATE: 84 BPM | BODY MASS INDEX: 34.02 KG/M2

## 2020-02-10 PROCEDURE — 99214 OFFICE O/P EST MOD 30 MIN: CPT | Performed by: FAMILY MEDICINE

## 2020-02-10 RX ORDER — AZITHROMYCIN 250 MG/1
TABLET, FILM COATED ORAL
Qty: 1 PACKET | Refills: 0 | Status: SHIPPED | OUTPATIENT
Start: 2020-02-10 | End: 2020-03-16 | Stop reason: ALTCHOICE

## 2020-02-10 ASSESSMENT — ENCOUNTER SYMPTOMS
GASTROINTESTINAL NEGATIVE: 1
RESPIRATORY NEGATIVE: 1
EYES NEGATIVE: 1
ALLERGIC/IMMUNOLOGIC NEGATIVE: 1

## 2020-02-10 NOTE — PROGRESS NOTES
2/10/20     Bessy Dorantes    : 1965 Sex: female   Age: 49 y.o. Chief Complaint   Patient presents with    Follow-Up from Hospital     \"lung infection\"    Neck Swelling     gland in neck for the last week. right side. Prior to Admission medications    Medication Sig Start Date End Date Taking? Authorizing Provider   azithromycin (ZITHROMAX Z-JORDAN) 250 MG tablet 2 today  Then 1 qd  4 days 2/10/20  Yes Tommy Rios DO   albuterol sulfate HFA (PROAIR HFA) 108 (90 Base) MCG/ACT inhaler Inhale 2 puffs into the lungs every 4 hours as needed for Wheezing or Shortness of Breath 19  Yes Sarah Jerry PA-C   omeprazole (PRILOSEC) 40 MG delayed release capsule Take 40 mg by mouth daily    Yes Historical Provider, MD   valACYclovir (VALTREX) 1 g tablet Take 2 tablets by mouth 2 times daily  Patient not taking: Reported on 2/10/2020 1/31/20   Sarah Jerry PA-C   nystatin (MYCOSTATIN) 988446 UNIT/ML suspension Take 5 mLs by mouth 4 times daily for 10 days Retain in mouth as long as possible  Patient not taking: Reported on 2/10/2020 1/31/20 2/10/20  Sarah Jerry PA-C   UNABLE TO FIND Cleared to return to work from a pulmonary standpoint  Patient not taking: Reported on 2/10/2020 1/24/20   Paloma Pablo MD   predniSONE (DELTASONE) 10 MG tablet Take 40 mg for 2 days, then take 30 mg for 2 days, then take 20 mg for 2 days, then take 10 mg for 2 days  Patient not taking: Reported on 2/10/2020 1/23/20   Beatriz Weaver MD   Fluticasone Furoate-Vilanterol (BREO ELLIPTA IN) Inhale into the lungs See Admin Instructions Uses every day when symptoms are worse. Hasn't had to use for several months. Gets samples. Historical Provider, MD          HPI: Patient is seen today medical follow-up from the hospital asthmatic bronchitis. She is currently doing much better. Incidental finding on CT scan of pulmonary nodules x4. Will be following up with Dr. Rambo Cooney.            Review of Systems Hcl [Meperidine] Palpitations       Social History     Tobacco Use    Smoking status: Never Smoker    Smokeless tobacco: Never Used   Substance Use Topics    Alcohol use: No     Comment: social drinker     Drug use: No      Past Surgical History:   Procedure Laterality Date    APPENDECTOMY  2001    COLONOSCOPY  08/09/2017    ECTOPIC PREGNANCY SURGERY  1995    THYROID SURGERY  1985    partial thyroidectomy     Family History   Problem Relation Age of Onset    High Blood Pressure Mother     Heart Failure Father     Stroke Father     Pacemaker Father     Diabetes Father     COPD Brother      Past Medical History:   Diagnosis Date    Asthma     no recent issues, was released from Dr. James Burgos, feel it is allergen induced , seasonal    Murmur     h/o, no longer present       Vitals:    02/10/20 1606 02/10/20 1612   BP: (!) 148/84 132/80   Pulse: 84    Resp: 18    Temp: 98.1 °F (36.7 °C)    TempSrc: Temporal    SpO2: 97%    Weight: 186 lb (84.4 kg)      BP Readings from Last 3 Encounters:   02/10/20 132/80   01/23/20 (!) 140/82   01/10/20 130/84        Physical Exam  Vitals signs and nursing note reviewed. Constitutional:       Appearance: She is well-developed. HENT:      Head: Normocephalic. Right Ear: External ear normal.      Left Ear: External ear normal.      Nose: Congestion present. Eyes:      Conjunctiva/sclera: Conjunctivae normal.      Pupils: Pupils are equal, round, and reactive to light. Neck:      Musculoskeletal: Normal range of motion and neck supple. Cardiovascular:      Rate and Rhythm: Normal rate. Pulmonary:      Breath sounds: Normal breath sounds. Abdominal:      General: Bowel sounds are normal.      Palpations: Abdomen is soft. Musculoskeletal: Normal range of motion. Skin:     General: Skin is warm and dry. Neurological:      Mental Status: She is alert and oriented to person, place, and time.    Psychiatric:         Behavior: Behavior normal.     Exam findings are stable. Anterior cervical adenopathy is present. RX as noted. We will follow-up in March after pulmonary evaluation. Repeat CT scan of the chest at that time as well. Plan Per Assessment:  Faith Crespo was seen today for follow-up from hospital and neck swelling. Diagnoses and all orders for this visit:    Mild persistent asthma with acute exacerbation    Acquired hypothyroidism    Pulmonary nodules    Mitral valve prolapse    Other orders  -     azithromycin (ZITHROMAX Z-JORDAN) 250 MG tablet; 2 today  Then 1 qd  4 days            Return in about 6 weeks (around 3/23/2020). Jose Espinoza,     Note was generated with the assistance of voice recognition software. Document was reviewed however may contain grammatical errors.

## 2020-02-11 ENCOUNTER — CARE COORDINATION (OUTPATIENT)
Dept: OTHER | Facility: CLINIC | Age: 55
End: 2020-02-11

## 2020-02-11 NOTE — CARE COORDINATION
[facility] Transitions Follow Up Call    2020    Patient: Tricia Casiano  Patient : 1965   MRN: [id number]  Reason for Admission: Chest Pain  Discharge Date: 20 RARS: Readmission Risk Score: 10         Spoke with: Jose Eduardo Bucio, patient    . Summary: Called tp to follow up on progross, discuss any nw needs or concerns anor changes in Plan of Care. Chest pian- pt denies any further chest pain. She states she completed her appts with Cardiology and PCP and will be seeing Pulmonology in 2 weeks. She will be getting a CT scan next week. She states she has all of her medications filled and she is doing well. ACM will sign off. Care Transitions Subsequent and Final Call    Schedule Follow Up Appointment with PCP:  Completed  Subsequent and Final Calls  Do you have any ongoing symptoms?:  No  Have your medications changed?:  No  Do you have any questions related to your medications?:  No  Do you currently have any active services?:  No  Do you have any needs or concerns that I can assist you with?:  No  Identified Barriers:  None  Care Transitions Interventions  No Identified Needs                      Specialty Service Referral:  Completed    Other Interventions:             Follow Up  Future Appointments   Date Time Provider Kaylah Wright   3/4/2020  1:30 PM Javier Ramirez MD [facility]   3/25/2020  8:00 AM DO BOBBY Smiley [facility]   2020  7:15 AM [facility]       Fina Falcon RN

## 2020-02-26 ENCOUNTER — HOSPITAL ENCOUNTER (OUTPATIENT)
Dept: CT IMAGING | Age: 55
Discharge: HOME | End: 2020-02-28
Payer: COMMERCIAL

## 2020-02-26 PROCEDURE — 71260 CT THORAX DX C+: CPT

## 2020-02-26 PROCEDURE — 6360000004 HC RX CONTRAST MEDICATION: Performed by: RADIOLOGY

## 2020-02-26 PROCEDURE — 2580000003 HC RX 258: Performed by: RADIOLOGY

## 2020-02-26 RX ORDER — SODIUM CHLORIDE 0.9 % (FLUSH) 0.9 %
10 SYRINGE (ML) INJECTION PRN
Status: DISCONTINUED | OUTPATIENT
Start: 2020-02-26 | End: 2020-02-29 | Stop reason: HOSPADM

## 2020-02-26 RX ADMIN — Medication 10 ML: at 14:54

## 2020-02-26 RX ADMIN — IOPAMIDOL 90 ML: 755 INJECTION, SOLUTION INTRAVENOUS at 14:54

## 2020-03-04 ENCOUNTER — HOSPITAL ENCOUNTER (OUTPATIENT)
Age: 55
Discharge: HOME | End: 2020-03-06
Payer: COMMERCIAL

## 2020-03-04 LAB — EOSINOPHILS ABSOLUTE COUNT: 310 /UL (ref 50–250)

## 2020-03-04 PROCEDURE — 82785 ASSAY OF IGE: CPT

## 2020-03-04 PROCEDURE — 85048 AUTOMATED LEUKOCYTE COUNT: CPT

## 2020-03-04 PROCEDURE — 86003 ALLG SPEC IGE CRUDE XTRC EA: CPT

## 2020-03-10 LAB
Lab: NORMAL
REPORT: NORMAL
THIS TEST SENT TO: NORMAL

## 2020-03-11 ENCOUNTER — TELEPHONE (OUTPATIENT)
Dept: PRIMARY CARE CLINIC | Age: 55
End: 2020-03-11

## 2020-03-11 RX ORDER — AZITHROMYCIN 250 MG/1
TABLET, FILM COATED ORAL
Qty: 1 PACKET | Refills: 0 | Status: SHIPPED
Start: 2020-03-11 | End: 2020-03-16

## 2020-04-07 RX ORDER — ALBUTEROL SULFATE 90 UG/1
2 AEROSOL, METERED RESPIRATORY (INHALATION) EVERY 4 HOURS PRN
Qty: 1 INHALER | Refills: 5 | Status: SHIPPED
Start: 2020-04-07 | End: 2020-06-08 | Stop reason: SDUPTHER

## 2020-06-02 ENCOUNTER — TELEPHONE (OUTPATIENT)
Dept: PRIMARY CARE CLINIC | Age: 55
End: 2020-06-02

## 2020-06-04 ENCOUNTER — HOSPITAL ENCOUNTER (OUTPATIENT)
Age: 55
Discharge: HOME | End: 2020-06-04
Payer: COMMERCIAL

## 2020-06-04 LAB
ALBUMIN SERPL-MCNC: 4.4 G/DL (ref 3.5–5.2)
ALP BLD-CCNC: 107 U/L (ref 35–104)
ALT SERPL-CCNC: 14 U/L (ref 0–32)
ANION GAP SERPL CALCULATED.3IONS-SCNC: 13 MMOL/L (ref 7–16)
AST SERPL-CCNC: 11 U/L (ref 0–31)
BASOPHILS ABSOLUTE: 0.1 E9/L (ref 0–0.2)
BASOPHILS RELATIVE PERCENT: 1.5 % (ref 0–2)
BILIRUB SERPL-MCNC: 0.6 MG/DL (ref 0–1.2)
BUN BLDV-MCNC: 15 MG/DL (ref 6–20)
CALCIUM SERPL-MCNC: 9.8 MG/DL (ref 8.6–10.2)
CHLORIDE BLD-SCNC: 103 MMOL/L (ref 98–107)
CHOLESTEROL, TOTAL: 203 MG/DL (ref 0–199)
CO2: 25 MMOL/L (ref 22–29)
CREAT SERPL-MCNC: 0.7 MG/DL (ref 0.5–1)
EOSINOPHILS ABSOLUTE: 0.42 E9/L (ref 0.05–0.5)
EOSINOPHILS RELATIVE PERCENT: 6.4 % (ref 0–6)
GFR AFRICAN AMERICAN: >60
GFR NON-AFRICAN AMERICAN: >60 ML/MIN/1.73
GLUCOSE BLD-MCNC: 114 MG/DL (ref 74–99)
HCT VFR BLD CALC: 44.3 % (ref 34–48)
HDLC SERPL-MCNC: 53 MG/DL
HEMOGLOBIN: 14.8 G/DL (ref 11.5–15.5)
IMMATURE GRANULOCYTES #: 0.03 E9/L
IMMATURE GRANULOCYTES %: 0.5 % (ref 0–5)
LDL CHOLESTEROL CALCULATED: 132 MG/DL (ref 0–99)
LYMPHOCYTES ABSOLUTE: 1.81 E9/L (ref 1.5–4)
LYMPHOCYTES RELATIVE PERCENT: 27.7 % (ref 20–42)
MCH RBC QN AUTO: 27.8 PG (ref 26–35)
MCHC RBC AUTO-ENTMCNC: 33.4 % (ref 32–34.5)
MCV RBC AUTO: 83.3 FL (ref 80–99.9)
MONOCYTES ABSOLUTE: 0.49 E9/L (ref 0.1–0.95)
MONOCYTES RELATIVE PERCENT: 7.5 % (ref 2–12)
NEUTROPHILS ABSOLUTE: 3.69 E9/L (ref 1.8–7.3)
NEUTROPHILS RELATIVE PERCENT: 56.4 % (ref 43–80)
PDW BLD-RTO: 12.3 FL (ref 11.5–15)
PLATELET # BLD: 427 E9/L (ref 130–450)
PMV BLD AUTO: 9.8 FL (ref 7–12)
POTASSIUM SERPL-SCNC: 4.4 MMOL/L (ref 3.5–5)
RBC # BLD: 5.32 E12/L (ref 3.5–5.5)
SODIUM BLD-SCNC: 141 MMOL/L (ref 132–146)
T4 TOTAL: 9.4 MCG/DL (ref 4.5–11.7)
TOTAL PROTEIN: 7.4 G/DL (ref 6.4–8.3)
TRIGL SERPL-MCNC: 91 MG/DL (ref 0–149)
TSH SERPL DL<=0.05 MIU/L-ACNC: 1.36 UIU/ML (ref 0.27–4.2)
VLDLC SERPL CALC-MCNC: 18 MG/DL
WBC # BLD: 6.5 E9/L (ref 4.5–11.5)

## 2020-06-04 PROCEDURE — 80061 LIPID PANEL: CPT

## 2020-06-04 PROCEDURE — 80053 COMPREHEN METABOLIC PANEL: CPT

## 2020-06-04 PROCEDURE — 36415 COLL VENOUS BLD VENIPUNCTURE: CPT

## 2020-06-04 PROCEDURE — 84443 ASSAY THYROID STIM HORMONE: CPT

## 2020-06-04 PROCEDURE — 85025 COMPLETE CBC W/AUTO DIFF WBC: CPT

## 2020-06-04 PROCEDURE — 84436 ASSAY OF TOTAL THYROXINE: CPT

## 2020-06-08 ENCOUNTER — OFFICE VISIT (OUTPATIENT)
Dept: PRIMARY CARE CLINIC | Age: 55
End: 2020-06-08
Payer: COMMERCIAL

## 2020-06-08 VITALS
SYSTOLIC BLOOD PRESSURE: 126 MMHG | HEART RATE: 85 BPM | HEIGHT: 60 IN | DIASTOLIC BLOOD PRESSURE: 80 MMHG | TEMPERATURE: 98 F | OXYGEN SATURATION: 99 % | WEIGHT: 177.6 LBS | BODY MASS INDEX: 34.87 KG/M2 | RESPIRATION RATE: 16 BRPM

## 2020-06-08 PROBLEM — J45.21 MILD INTERMITTENT ASTHMA WITH ACUTE EXACERBATION: Status: ACTIVE | Noted: 2020-06-08

## 2020-06-08 PROBLEM — R73.01 ELEVATED FASTING GLUCOSE: Status: ACTIVE | Noted: 2020-06-08

## 2020-06-08 PROBLEM — K21.9 GASTROESOPHAGEAL REFLUX DISEASE WITHOUT ESOPHAGITIS: Status: ACTIVE | Noted: 2020-06-08

## 2020-06-08 LAB — HBA1C MFR BLD: 5.3 %

## 2020-06-08 PROCEDURE — 99396 PREV VISIT EST AGE 40-64: CPT | Performed by: FAMILY MEDICINE

## 2020-06-08 PROCEDURE — 83036 HEMOGLOBIN GLYCOSYLATED A1C: CPT | Performed by: FAMILY MEDICINE

## 2020-06-08 RX ORDER — ALBUTEROL SULFATE 90 UG/1
2 AEROSOL, METERED RESPIRATORY (INHALATION) EVERY 4 HOURS PRN
Qty: 1 INHALER | Refills: 5 | Status: SHIPPED
Start: 2020-06-08 | End: 2021-03-09 | Stop reason: SDUPTHER

## 2020-06-08 ASSESSMENT — ENCOUNTER SYMPTOMS
EYES NEGATIVE: 1
RESPIRATORY NEGATIVE: 1
ALLERGIC/IMMUNOLOGIC NEGATIVE: 1
GASTROINTESTINAL NEGATIVE: 1

## 2020-06-08 NOTE — PROGRESS NOTES
20     Luz Maria Cole    : 1965 Sex: female   Age: 49 y.o. Chief Complaint   Patient presents with    Employment Physical     Be Well Within        Prior to Admission medications    Medication Sig Start Date End Date Taking? Authorizing Provider   albuterol sulfate HFA (PROAIR HFA) 108 (90 Base) MCG/ACT inhaler Inhale 2 puffs into the lungs every 4 hours as needed for Wheezing or Shortness of Breath 20  Yes Kalia Rios, DO   omeprazole (PRILOSEC) 40 MG delayed release capsule Take 40 mg by mouth as needed    Yes Historical Provider, MD          HPI: Patient evaluated today for health maintenance physical.  Wellness exam.  Medically she is very stable. History of pulmonary nodules hypothyroidism gastroesophageal reflux disease asthmatic bronchitis. Medically very stable. Medications well-tolerated. Review of Systems   Constitutional: Negative. HENT: Negative. Eyes: Negative. Respiratory: Negative. Gastrointestinal: Negative. Endocrine: Negative. Genitourinary: Negative. Musculoskeletal: Negative. Skin: Negative. Allergic/Immunologic: Negative. Neurological: Negative. Hematological: Negative. Psychiatric/Behavioral: Negative.                Current Outpatient Medications:     albuterol sulfate HFA (PROAIR HFA) 108 (90 Base) MCG/ACT inhaler, Inhale 2 puffs into the lungs every 4 hours as needed for Wheezing or Shortness of Breath, Disp: 1 Inhaler, Rfl: 5    omeprazole (PRILOSEC) 40 MG delayed release capsule, Take 40 mg by mouth as needed , Disp: , Rfl:     Allergies   Allergen Reactions    Codeine Other (See Comments)     Heart raced, felt hot    Morphine Nausea Only    Demerol Hcl [Meperidine] Palpitations       Social History     Tobacco Use    Smoking status: Never Smoker    Smokeless tobacco: Never Used   Substance Use Topics    Alcohol use: No     Comment: social drinker     Drug use: No      Past Surgical History: TSH 1.360 06/04/2020    TSH 1.050 06/14/2018    B4TYXRA 9.4 06/04/2020    A5TVHPZ 8.2 05/02/2016    T4FREE 1.66 06/14/2018    T4FREE 1.48 12/14/2017     Lab Results   Component Value Date    CHOL 203 (H) 06/04/2020    CHOL 237 (H) 01/22/2020     Lab Results   Component Value Date    TRIG 91 06/04/2020    TRIG 80 01/22/2020     Lab Results   Component Value Date    HDL 53 06/04/2020    HDL 69 01/22/2020     No results found for: [facility]  Lab Results   Component Value Date    LABVLDL 18 06/04/2020    LABVLDL 16 01/22/2020     No results found for: [facility]  Lab Results   Component Value Date    WBC 6.5 06/04/2020    HGB 14.8 06/04/2020    HCT 44.3 06/04/2020    MCV 83.3 06/04/2020     06/04/2020    LYMPHOPCT 27.7 06/04/2020    RBC 5.32 06/04/2020    MCH 27.8 06/04/2020    MCHC 33.4 06/04/2020    RDW 12.3 06/04/2020     Lab Results   Component Value Date     06/04/2020    K 4.4 06/04/2020     06/04/2020    CO2 25 06/04/2020    BUN 15 06/04/2020    CREATININE 0.7 06/04/2020    GLUCOSE 114 (H) 06/04/2020    CALCIUM 9.8 06/04/2020    PROT 7.4 06/04/2020    LABALBU 4.4 06/04/2020    BILITOT 0.6 06/04/2020    ALKPHOS 107 (H) 06/04/2020    AST 11 06/04/2020    ALT 14 06/04/2020    LABGLOM >60 06/04/2020    GFRAA >60 06/04/2020      No results found for: PSA, PSADIA   Lab Results   Component Value Date    LABA1C 5.3 06/08/2020    LABA1C 5.8 12/14/2017    LABA1C 5.5 05/02/2016     No results found for: EAG           Plan Per Assessment:  Swathi Goel was seen today for employment physical.    Diagnoses and all orders for this visit:    Elevated fasting glucose  -     POCT glycosylated hemoglobin (Hb A1C)    Mild intermittent asthma with acute exacerbation  -     albuterol sulfate HFA (PROAIR HFA) 108 (90 Base) MCG/ACT inhaler;  Inhale 2 puffs into the lungs every 4 hours as needed for Wheezing or Shortness of Breath    Gastroesophageal reflux disease without esophagitis            No follow-ups on

## 2020-09-23 ENCOUNTER — HOSPITAL ENCOUNTER (OUTPATIENT)
Dept: CT IMAGING | Age: 55
Discharge: HOME | End: 2020-09-25
Payer: COMMERCIAL

## 2020-09-23 PROCEDURE — 71250 CT THORAX DX C-: CPT

## 2020-10-01 ENCOUNTER — OFFICE VISIT (OUTPATIENT)
Dept: PRIMARY CARE CLINIC | Age: 55
End: 2020-10-01
Payer: COMMERCIAL

## 2020-10-01 VITALS
RESPIRATION RATE: 16 BRPM | OXYGEN SATURATION: 99 % | HEART RATE: 59 BPM | TEMPERATURE: 97 F | BODY MASS INDEX: 33.89 KG/M2 | SYSTOLIC BLOOD PRESSURE: 136 MMHG | DIASTOLIC BLOOD PRESSURE: 70 MMHG | WEIGHT: 175 LBS

## 2020-10-01 PROCEDURE — 99214 OFFICE O/P EST MOD 30 MIN: CPT | Performed by: FAMILY MEDICINE

## 2020-10-01 ASSESSMENT — ENCOUNTER SYMPTOMS
RESPIRATORY NEGATIVE: 1
EYES NEGATIVE: 1
ALLERGIC/IMMUNOLOGIC NEGATIVE: 1
GASTROINTESTINAL NEGATIVE: 1

## 2020-10-01 NOTE — PROGRESS NOTES
10/1/20     Ariela Mitchell    : 1965 Sex: female   Age: 49 y.o. Chief Complaint   Patient presents with    Gastroesophageal Reflux    Hypothyroidism    Asthma       Prior to Admission medications    Medication Sig Start Date End Date Taking? Authorizing Provider   albuterol sulfate HFA (PROAIR HFA) 108 (90 Base) MCG/ACT inhaler Inhale 2 puffs into the lungs every 4 hours as needed for Wheezing or Shortness of Breath 20  Yes Melissa Rios, DO   omeprazole (PRILOSEC) 40 MG delayed release capsule Take 40 mg by mouth as needed    Yes Historical Provider, MD          HPI: Patient evaluated today pulmonary nodules intermittent asthmatic bronchitis hypothyroid mild glucose intolerance. Medically overall is stable. Reviewed CT scan of the chest and pulmonary nodules essentially unchanged. We will follow-up with           Review of Systems   Constitutional: Negative. HENT: Negative. Eyes: Negative. Respiratory: Negative. Gastrointestinal: Negative. Endocrine: Negative. Genitourinary: Negative. Musculoskeletal: Negative. Skin: Negative. Allergic/Immunologic: Negative. Neurological: Negative. Hematological: Negative. Psychiatric/Behavioral: Negative. Systems review today is stable doing well with current care. Current Outpatient Medications:     albuterol sulfate HFA (PROAIR HFA) 108 (90 Base) MCG/ACT inhaler, Inhale 2 puffs into the lungs every 4 hours as needed for Wheezing or Shortness of Breath, Disp: 1 Inhaler, Rfl: 5    omeprazole (PRILOSEC) 40 MG delayed release capsule, Take 40 mg by mouth as needed , Disp: , Rfl:     Allergies   Allergen Reactions    Codeine Other (See Comments)     Heart raced, felt hot    Morphine Nausea Only    Demerol Hcl [Meperidine] Palpitations       Social History     Tobacco Use    Smoking status: Never Smoker    Smokeless tobacco: Never Used   Substance Use Topics    Alcohol use:  No Comment: social drinker     Drug use: No      Past Surgical History:   Procedure Laterality Date    APPENDECTOMY  2001    COLONOSCOPY  08/09/2017    ECTOPIC PREGNANCY SURGERY  1995    THYROID SURGERY  1985    partial thyroidectomy     Family History   Problem Relation Age of Onset    High Blood Pressure Mother     Heart Failure Father     Stroke Father     Pacemaker Father     Diabetes Father     COPD Brother      Past Medical History:   Diagnosis Date    Asthma     no recent issues, was released from Dr. Sammie Jones, feel it is allergen induced , seasonal    Murmur     h/o, no longer present       Vitals:    10/01/20 0733   BP: 136/70   Pulse: 59   Resp: 16   Temp: 97 °F (36.1 °C)   TempSrc: Temporal   SpO2: 99%   Weight: 175 lb (79.4 kg)     BP Readings from Last 3 Encounters:   10/01/20 136/70   06/08/20 126/80   03/04/20 (!) 142/80        Physical Exam  Vitals signs and nursing note reviewed. Constitutional:       Appearance: She is well-developed. HENT:      Head: Normocephalic. Right Ear: External ear normal.      Left Ear: External ear normal.      Nose: Nose normal.   Eyes:      Conjunctiva/sclera: Conjunctivae normal.      Pupils: Pupils are equal, round, and reactive to light. Neck:      Musculoskeletal: Normal range of motion and neck supple. Cardiovascular:      Rate and Rhythm: Normal rate. Pulmonary:      Breath sounds: Normal breath sounds. Abdominal:      General: Bowel sounds are normal.      Palpations: Abdomen is soft. Musculoskeletal: Normal range of motion. Skin:     General: Skin is warm and dry. Neurological:      Mental Status: She is alert and oriented to person, place, and time. Psychiatric:         Behavior: Behavior normal.     Today's vitals physical examination stable. We will maintain present medications care. Reassessment 3 months and blood work at that time.           Plan Per Assessment:  Preethi Gayle was seen today for gastroesophageal reflux, hypothyroidism and asthma. Diagnoses and all orders for this visit:    Pulmonary nodules    Mild intermittent asthma with acute exacerbation    Acquired hypothyroidism  -     CBC Auto Differential; Future  -     Comprehensive Metabolic Panel; Future  -     Hemoglobin A1C; Future  -     Lipid Panel; Future  -     T4; Future  -     TSH without Reflex; Future    Elevated fasting glucose  -     CBC Auto Differential; Future  -     Comprehensive Metabolic Panel; Future  -     Hemoglobin A1C; Future  -     Lipid Panel; Future  -     T4; Future  -     TSH without Reflex; Future            No follow-ups on file. Saul Pickens DO    Note was generated with the assistance of voice recognition software. Document was reviewed however may contain grammatical errors.

## 2020-12-16 ENCOUNTER — HOSPITAL ENCOUNTER (OUTPATIENT)
Dept: GENERAL RADIOLOGY | Age: 55
Discharge: HOME | End: 2020-12-18
Payer: COMMERCIAL

## 2020-12-16 PROCEDURE — 77080 DXA BONE DENSITY AXIAL: CPT

## 2020-12-16 PROCEDURE — 77063 BREAST TOMOSYNTHESIS BI: CPT

## 2021-01-04 ENCOUNTER — OFFICE VISIT (OUTPATIENT)
Dept: PRIMARY CARE CLINIC | Age: 56
End: 2021-01-04
Payer: COMMERCIAL

## 2021-01-04 VITALS
SYSTOLIC BLOOD PRESSURE: 130 MMHG | OXYGEN SATURATION: 99 % | DIASTOLIC BLOOD PRESSURE: 76 MMHG | HEART RATE: 60 BPM | TEMPERATURE: 98.1 F

## 2021-01-04 DIAGNOSIS — U07.1 COVID-19: Primary | ICD-10-CM

## 2021-01-04 LAB
Lab: ABNORMAL
QC PASS/FAIL: ABNORMAL
SARS-COV-2, POC: DETECTED

## 2021-01-04 PROCEDURE — 99213 OFFICE O/P EST LOW 20 MIN: CPT | Performed by: NURSE PRACTITIONER

## 2021-01-04 PROCEDURE — 87426 SARSCOV CORONAVIRUS AG IA: CPT | Performed by: NURSE PRACTITIONER

## 2021-01-04 RX ORDER — DOXYCYCLINE HYCLATE 100 MG/1
100 CAPSULE ORAL 2 TIMES DAILY
Qty: 20 CAPSULE | Refills: 0 | Status: SHIPPED
Start: 2021-01-04 | End: 2021-01-07 | Stop reason: CLARIF

## 2021-01-04 NOTE — LETTER
[address]  [address] [place] [id number]  Dept: [phone]  Dept Fax: [phone]    MISSY Rodas CNP      1/4/2021     Patient: Skinny Mckinney   YOB: 1965       To Whom It May Concern: It is my medical opinion that Skinny Mckinney should remain out of work while acutely ill. She did test positive for COVID-19 and requires 10-day isolation from symptom onset. Patient can return to work after 10 days of symptoms if there is no fever for 24 hours without fever reducing medications and improvement in symptoms. If you have any questions or concerns, please don't hesitate to call.     Sincerely,        MISSY Rodas CNP

## 2021-01-04 NOTE — PROGRESS NOTES
Chief Complaint   Nasal Congestion and Back Pain (pt thinks it's just a pulled muscle but is now having chest pain)      History of Present Illness   Source of history provided by:  patientNancie Lowe is a 48 y.o. old female who presents to the flu clinic with complaints of Generalized Body Aches, Rhinorrhea, Nasal Congestion, Post nasal drip, Chest congestion, Shortness of breath and Patient was exposed to someone who is a known Covid-19 infected person or directly caring for such person x 5 days. States symptoms have worsened since onset. Has been taking Tylenol and Tylenol sinus for the symptoms with some relief. Denies any Fever, Cough, Nausea, Vomiting, Chest Pain, Abdominal Pain, Rash or Lethargy. Reports history of asthma, did have to use inhaler last night. Denies tobacco use. 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 ROS   Pertinent positives and negatives are stated within HPI, all other systems reviewed and are negative. Past Medical History:  has a past medical history of Asthma and Murmur. Past Surgical History:  has a past surgical history that includes Appendectomy (2001); Thyroid surgery (1985); Ectopic pregnancy surgery (1995); and Colonoscopy (08/09/2017). Social History:  reports that she has never smoked. She has never used smokeless tobacco. She reports that she does not drink alcohol or use drugs. Family History: family history includes COPD in her brother; Diabetes in her father; Heart Failure in her father; High Blood Pressure in her mother; Pacemaker in her father; Stroke in her father.   Allergies: Codeine, Morphine, and Demerol hcl [meperidine]    Physical Exam   Vital Signs:  /76 (Site: Left Upper Arm, Position: Sitting, Cuff Size: Large Adult)   Pulse 60   Temp 98.1 °F (36.7 °C) (Oral)   LMP 05/31/2017   SpO2 99%    Oxygen Saturation Interpretation: Normal. Constitutional:  Alert, development consistent with age. NAD. Head:  NC/NT. Airway patent. Ears: TMs clear bilaterally. Canals without exudate or swelling bilaterally. Mouth: Posterior pharynx with mild erythema and clear postnasal drip. No tonsillar hypertrophy or exudate. Neck:  Normal ROM. Supple. No anterior cervical adenopathy noted. Lungs: CTAB without wheezes, rales, or rhonchi. CV:  Regular rate and rhythm, normal heart sounds, without pathological murmurs, ectopy, gallops, or rubs. Skin:  Normal turgor. Warm, dry, without visible rash. Lymphatic: No lymphangitis or adenopathy noted. Neurological:  Oriented. Motor functions intact. Lab / Imaging Results   (All laboratory and radiology results have been personally reviewed by myself)  Labs:  Results for orders placed or performed in visit on 01/04/21   POCT COVID-19, Antigen   Result Value Ref Range    SARS-COV-2, POC Detected Not Detected    Lot Number [id number]     QC Pass/Fail Pass        Imaging: All Radiology results interpreted by Radiologist unless otherwise noted. No results found. Medical Decision Making   Pt non-toxic, in no apparent distress and stable at time of discharge. Assessment/Plan   Edita Cazares was seen today for nasal congestion and back pain. Diagnoses and all orders for this visit:    COVID-19  -     POCT COVID-19, Antigen  -     doxycycline hyclate (VIBRAMYCIN) 100 MG capsule;  Take 1 capsule by mouth 2 times daily for 10 days

## 2021-01-05 ENCOUNTER — TELEPHONE (OUTPATIENT)
Dept: PRIMARY CARE CLINIC | Age: 56
End: 2021-01-05

## 2021-01-05 NOTE — TELEPHONE ENCOUNTER
Pt tested positive for COVID yesterday. She was given a script for Doxycycline bid for 7 days. She has no sob but is having chest pains and heaviness but she thinks that could have something to do with her asthma also. Pt asking if she should be on a steroid as well.

## 2021-01-05 NOTE — TELEPHONE ENCOUNTER
Scheduled for Thursday @ 11:00 pt did not want seen sooner at this pt.  Advised to go to er if worsening

## 2021-01-07 ENCOUNTER — OFFICE VISIT (OUTPATIENT)
Dept: PRIMARY CARE CLINIC | Age: 56
End: 2021-01-07
Payer: COMMERCIAL

## 2021-01-07 VITALS
OXYGEN SATURATION: 96 % | TEMPERATURE: 97 F | DIASTOLIC BLOOD PRESSURE: 84 MMHG | SYSTOLIC BLOOD PRESSURE: 140 MMHG | HEART RATE: 61 BPM

## 2021-01-07 DIAGNOSIS — J45.30 MILD PERSISTENT ASTHMA WITHOUT COMPLICATION: Primary | Chronic | ICD-10-CM

## 2021-01-07 PROCEDURE — 99214 OFFICE O/P EST MOD 30 MIN: CPT | Performed by: FAMILY MEDICINE

## 2021-01-07 RX ORDER — AZITHROMYCIN 250 MG/1
TABLET, FILM COATED ORAL
Qty: 1 PACKET | Refills: 0 | Status: SHIPPED
Start: 2021-01-07 | End: 2021-01-25 | Stop reason: CLARIF

## 2021-01-07 RX ORDER — PREDNISONE 10 MG/1
TABLET ORAL
Qty: 10 TABLET | Refills: 0 | Status: SHIPPED
Start: 2021-01-07 | End: 2021-01-25 | Stop reason: CLARIF

## 2021-01-07 ASSESSMENT — ENCOUNTER SYMPTOMS
COUGH: 1
WHEEZING: 1
GASTROINTESTINAL NEGATIVE: 1
ALLERGIC/IMMUNOLOGIC NEGATIVE: 1
EYES NEGATIVE: 1

## 2021-01-07 NOTE — PROGRESS NOTES
21     Lawanda Jaden    : 1965 Sex: female   Age: 48 y.o. Chief Complaint   Patient presents with    Positive For Covid-19     DIAGNOSED 21 IN FLU CLINIC. Had alot of back pain. Stopped doxy due to side effects       Prior to Admission medications    Medication Sig Start Date End Date Taking? Authorizing Provider   azithromycin (ZITHROMAX Z-JORDAN) 250 MG tablet 2 today  Then 1 qd  4 days 21  Yes Amanda Rios DO   predniSONE (DELTASONE) 10 MG tablet 3 qd x 5 days  2 qd x 5 days then 1 qd 21  Yes Amanda Rios DO   montelukast (SINGULAIR) 10 MG tablet Take 1 tablet by mouth nightly 20  Yes Logan Walters MD   albuterol sulfate HFA (PROAIR HFA) 108 (90 Base) MCG/ACT inhaler Inhale 2 puffs into the lungs every 4 hours as needed for Wheezing or Shortness of Breath 20  Yes Amanda Rios DO   omeprazole (PRILOSEC) 40 MG delayed release capsule Take 40 mg by mouth as needed    Yes Historical Provider, MD          HPI: Tess Miller seen today recent Covid exposure. Currently with some respiratory symptoms and myalgias. Adjustment with medication today. Asthmatic bronchitis history. Continue with inhalers as prescribed and then the addition of Zithromax and prednisone weaning course today. Grieving process loss of her mom recently emotional support provided. Loss of her dad this past year as well. Review of Systems   Constitutional: Negative. HENT: Positive for congestion. Eyes: Negative. Respiratory: Positive for cough and wheezing. Gastrointestinal: Negative. Endocrine: Negative. Genitourinary: Negative. Musculoskeletal: Positive for myalgias. Skin: Negative. Allergic/Immunologic: Negative. Neurological: Negative. Hematological: Negative. Psychiatric/Behavioral: Negative.                Current Outpatient Medications:   azithromycin (ZITHROMAX Z-JORDAN) 250 MG tablet, 2 today  Then 1 qd  4 days, Disp: 1 packet, Rfl: 0    predniSONE (DELTASONE) 10 MG tablet, 3 qd x 5 days  2 qd x 5 days then 1 qd, Disp: 10 tablet, Rfl: 0    montelukast (SINGULAIR) 10 MG tablet, Take 1 tablet by mouth nightly, Disp: 30 tablet, Rfl: 5    albuterol sulfate HFA (PROAIR HFA) 108 (90 Base) MCG/ACT inhaler, Inhale 2 puffs into the lungs every 4 hours as needed for Wheezing or Shortness of Breath, Disp: 1 Inhaler, Rfl: 5    omeprazole (PRILOSEC) 40 MG delayed release capsule, Take 40 mg by mouth as needed , Disp: , Rfl:     Allergies   Allergen Reactions    Codeine Other (See Comments)     Heart raced, felt hot    Doxycycline     Morphine Nausea Only    Demerol Hcl [Meperidine] Palpitations       Social History     Tobacco Use    Smoking status: Never Smoker    Smokeless tobacco: Never Used   Substance Use Topics    Alcohol use: No     Comment: social drinker     Drug use: No      Past Surgical History:   Procedure Laterality Date    APPENDECTOMY  2001    COLONOSCOPY  08/09/2017    ECTOPIC PREGNANCY SURGERY  1995    THYROID SURGERY  1985    partial thyroidectomy     Family History   Problem Relation Age of Onset    High Blood Pressure Mother     Heart Failure Father     Stroke Father     Pacemaker Father     Diabetes Father     COPD Brother      Past Medical History:   Diagnosis Date    Asthma     no recent issues, was released from Dr. Ho Duran, feel it is allergen induced , seasonal    Murmur     h/o, no longer present       Vitals:    01/07/21 1052   BP: (!) 140/84   Pulse: 61   Temp: 97 °F (36.1 °C)   SpO2: 96%     BP Readings from Last 3 Encounters:   01/07/21 (!) 140/84   01/04/21 130/76   12/02/20 136/78        Physical Exam  Vitals signs and nursing note reviewed. Constitutional:       Appearance: She is well-developed. HENT:      Head: Normocephalic.       Right Ear: External ear normal.      Left Ear: External ear normal.

## 2021-01-07 NOTE — PROGRESS NOTES
21     Gonzales Wiggins    : 1965 Sex: female   Age: 48 y.o. Chief Complaint   Patient presents with    Positive For Covid-19     DIAGNOSED 21 [facility]. Had alot of back pain. Stopped doxy due to side effects       Prior to Admission medications    Medication Sig Start Date End Date Taking?  Authorizing Provider   montelukast (SINGULAIR) 10 MG tablet Take 1 tablet by mouth nightly 20  Yes Jonah Christian MD   albuterol sulfate HFA (PROAIR HFA) 108 (90 Base) MCG/ACT inhaler Inhale 2 puffs into the lungs every 4 hours as needed for Wheezing or Shortness of Breath 20  Yes Cody Rios,    omeprazole (PRILOSEC) 40 MG delayed release capsule Take 40 mg by mouth as needed    Yes Historical Provider, MD          HPI:           Review of Systems           Current Outpatient Medications:     montelukast (SINGULAIR) 10 MG tablet, Take 1 tablet by mouth nightly, Disp: 30 tablet, Rfl: 5    albuterol sulfate HFA (PROAIR HFA) 108 (90 Base) MCG/ACT inhaler, Inhale 2 puffs into the lungs every 4 hours as needed for Wheezing or Shortness of Breath, Disp: 1 Inhaler, Rfl: 5    omeprazole (PRILOSEC) 40 MG delayed release capsule, Take 40 mg by mouth as needed , Disp: , Rfl:     Allergies   Allergen Reactions    Codeine Other (See Comments)     Heart raced, felt hot    Doxycycline     Morphine Nausea Only    Demerol Hcl [Meperidine] Palpitations       Social History     Tobacco Use    Smoking status: Never Smoker    Smokeless tobacco: Never Used   Substance Use Topics    Alcohol use: No     Comment: social drinker     Drug use: No      Past Surgical History:   Procedure Laterality Date    APPENDECTOMY  2001    COLONOSCOPY  2017    ECTOPIC PREGNANCY SURGERY  1995    THYROID SURGERY  1985    partial thyroidectomy     Family History   Problem Relation Age of Onset    High Blood Pressure Mother     Heart Failure Father     Stroke Father     Pacemaker Father

## 2021-01-25 ENCOUNTER — OFFICE VISIT (OUTPATIENT)
Dept: PRIMARY CARE CLINIC | Age: 56
End: 2021-01-25
Payer: COMMERCIAL

## 2021-01-25 VITALS
TEMPERATURE: 98.9 F | SYSTOLIC BLOOD PRESSURE: 130 MMHG | BODY MASS INDEX: 31.64 KG/M2 | DIASTOLIC BLOOD PRESSURE: 82 MMHG | HEART RATE: 77 BPM | OXYGEN SATURATION: 98 % | WEIGHT: 173 LBS

## 2021-01-25 DIAGNOSIS — U07.1 COVID-19: ICD-10-CM

## 2021-01-25 DIAGNOSIS — K21.9 GASTROESOPHAGEAL REFLUX DISEASE WITHOUT ESOPHAGITIS: ICD-10-CM

## 2021-01-25 DIAGNOSIS — J45.30 MILD PERSISTENT ASTHMA WITHOUT COMPLICATION: Chronic | ICD-10-CM

## 2021-01-25 DIAGNOSIS — R07.9 CHEST PAIN, UNSPECIFIED TYPE: Primary | ICD-10-CM

## 2021-01-25 PROCEDURE — 99214 OFFICE O/P EST MOD 30 MIN: CPT | Performed by: FAMILY MEDICINE

## 2021-01-25 PROCEDURE — 93000 ELECTROCARDIOGRAM COMPLETE: CPT | Performed by: FAMILY MEDICINE

## 2021-01-25 RX ORDER — OMEPRAZOLE 40 MG/1
40 CAPSULE, DELAYED RELEASE ORAL DAILY
Qty: 30 CAPSULE | Refills: 2 | Status: SHIPPED
Start: 2021-01-25 | End: 2021-06-29

## 2021-01-25 ASSESSMENT — PATIENT HEALTH QUESTIONNAIRE - PHQ9
SUM OF ALL RESPONSES TO PHQ QUESTIONS 1-9: 0
SUM OF ALL RESPONSES TO PHQ QUESTIONS 1-9: 0
2. FEELING DOWN, DEPRESSED OR HOPELESS: 0

## 2021-01-25 ASSESSMENT — ENCOUNTER SYMPTOMS
GASTROINTESTINAL NEGATIVE: 1
ALLERGIC/IMMUNOLOGIC NEGATIVE: 1
RESPIRATORY NEGATIVE: 1
EYES NEGATIVE: 1

## 2021-01-25 NOTE — PROGRESS NOTES
21     Tabatha Edwards    : 1965 Sex: female   Age: 48 y.o. Chief Complaint   Patient presents with    Positive For Covid-19     went back to work last week & doing good, does have mild tightness in chest       Prior to Admission medications    Medication Sig Start Date End Date Taking? Authorizing Provider   omeprazole (PRILOSEC) 40 MG delayed release capsule Take 1 capsule by mouth daily 21  Yes Joseph Rios DO   albuterol sulfate HFA (PROAIR HFA) 108 (90 Base) MCG/ACT inhaler Inhale 2 puffs into the lungs every 4 hours as needed for Wheezing or Shortness of Breath 20  Yes Mike Hill, DO          HPI: Patient evaluated today with chest pain asthma reflux disease follow-up. Medically she is doing well at this time. Some atypical chest pain persist with Covid 19 infection. EKG assessed today is unchanged sinus rhythm no acute change. Cautioned her if problems with persistent or worsening pain does need further cardiac evaluation work-up. Medications reviewed continue as prescribed. Review of Systems   Constitutional: Negative. HENT: Negative. Eyes: Negative. Respiratory: Negative. Gastrointestinal: Negative. Endocrine: Negative. Genitourinary: Negative. Musculoskeletal: Negative. Skin: Negative. Allergic/Immunologic: Negative. Neurological: Negative. Hematological: Negative. Psychiatric/Behavioral: Negative. Systems review all stable. Mild shortness of breath related to recent infection but daily improvement.       Current Outpatient Medications:     omeprazole (PRILOSEC) 40 MG delayed release capsule, Take 1 capsule by mouth daily, Disp: 30 capsule, Rfl: 2    albuterol sulfate HFA (PROAIR HFA) 108 (90 Base) MCG/ACT inhaler, Inhale 2 puffs into the lungs every 4 hours as needed for Wheezing or Shortness of Breath, Disp: 1 Inhaler, Rfl: 5    Allergies   Allergen Reactions    Codeine Other (See Comments) Heart raced, felt hot    Doxycycline     Morphine Nausea Only    Demerol Hcl [Meperidine] Palpitations       Social History     Tobacco Use    Smoking status: Never Smoker    Smokeless tobacco: Never Used   Substance Use Topics    Alcohol use: No     Comment: social drinker     Drug use: No      Past Surgical History:   Procedure Laterality Date    APPENDECTOMY  2001    COLONOSCOPY  08/09/2017    ECTOPIC PREGNANCY SURGERY  1995    THYROID SURGERY  1985    partial thyroidectomy     Family History   Problem Relation Age of Onset    High Blood Pressure Mother     Heart Failure Father     Stroke Father     Pacemaker Father     Diabetes Father     COPD Brother      Past Medical History:   Diagnosis Date    Asthma     no recent issues, was released from Dr. Nuñez December, feel it is allergen induced , seasonal    Murmur     h/o, no longer present       Vitals:    01/25/21 0748   BP: 130/82   Pulse: 77   Temp: 98.9 °F (37.2 °C)   SpO2: 98%   Weight: 173 lb (78.5 kg)     BP Readings from Last 3 Encounters:   01/25/21 130/82   01/07/21 (!) 140/84   01/04/21 130/76        Physical Exam  Vitals signs and nursing note reviewed. Constitutional:       Appearance: She is well-developed. HENT:      Head: Normocephalic. Right Ear: External ear normal.      Left Ear: External ear normal.      Nose: Nose normal.   Eyes:      Conjunctiva/sclera: Conjunctivae normal.      Pupils: Pupils are equal, round, and reactive to light. Neck:      Musculoskeletal: Normal range of motion and neck supple. Cardiovascular:      Rate and Rhythm: Normal rate. Pulmonary:      Breath sounds: Normal breath sounds. Abdominal:      General: Bowel sounds are normal.      Palpations: Abdomen is soft. Musculoskeletal: Normal range of motion. Skin:     General: Skin is warm and dry. Neurological:      Mental Status: She is alert and oriented to person, place, and time.    Psychiatric:         Behavior: Behavior normal. Today's vitals physical examination stable. Reflux more persistent and did ask her to resume her omeprazole on a daily basis. Follow-up with me in June for her physical.  Cardiology evaluation to be scheduled. Yearly follow-up. If discomfort or worsening pain worsening shortness of breath immediate follow-up. Plan Per Assessment:  Nasir Doctor was seen today for positive for covid-19. Diagnoses and all orders for this visit:    Chest pain, unspecified type  -     EKG 12 lead; Future  -     EKG 12 lead    Mild persistent asthma without complication    [id number]    Gastroesophageal reflux disease without esophagitis    Other orders  -     omeprazole (PRILOSEC) 40 MG delayed release capsule; Take 1 capsule by mouth daily            No follow-ups on file. Rebbeca Estimable, DO    Note was generated with the assistance of voice recognition software. Document was reviewed however may contain grammatical errors.

## 2021-01-29 ENCOUNTER — TELEPHONE (OUTPATIENT)
Dept: PRIMARY CARE CLINIC | Age: 56
End: 2021-01-29

## 2021-01-29 DIAGNOSIS — J01.00 ACUTE NON-RECURRENT MAXILLARY SINUSITIS: Primary | ICD-10-CM

## 2021-01-29 RX ORDER — AZITHROMYCIN 250 MG/1
TABLET, FILM COATED ORAL
Qty: 1 PACKET | Refills: 0 | Status: SHIPPED
Start: 2021-01-29 | End: 2021-02-10 | Stop reason: CLARIF

## 2021-01-29 NOTE — TELEPHONE ENCOUNTER
Patient calling she is having sinus nasal drainage asking if something could be called in to [facility]

## 2021-02-09 ENCOUNTER — TELEPHONE (OUTPATIENT)
Dept: PRIMARY CARE CLINIC | Age: 56
End: 2021-02-09

## 2021-02-10 ENCOUNTER — OFFICE VISIT (OUTPATIENT)
Dept: PRIMARY CARE CLINIC | Age: 56
End: 2021-02-10
Payer: COMMERCIAL

## 2021-02-10 VITALS
OXYGEN SATURATION: 97 % | TEMPERATURE: 99 F | SYSTOLIC BLOOD PRESSURE: 128 MMHG | DIASTOLIC BLOOD PRESSURE: 84 MMHG | HEART RATE: 67 BPM

## 2021-02-10 DIAGNOSIS — J45.30 MILD PERSISTENT ASTHMA WITHOUT COMPLICATION: Primary | Chronic | ICD-10-CM

## 2021-02-10 PROCEDURE — 99213 OFFICE O/P EST LOW 20 MIN: CPT | Performed by: FAMILY MEDICINE

## 2021-02-10 RX ORDER — PREDNISONE 10 MG/1
TABLET ORAL
Qty: 30 TABLET | Refills: 0 | Status: SHIPPED
Start: 2021-02-10 | End: 2021-03-09 | Stop reason: CLARIF

## 2021-02-10 RX ORDER — AZITHROMYCIN 250 MG/1
TABLET, FILM COATED ORAL
Qty: 1 PACKET | Refills: 0 | Status: SHIPPED
Start: 2021-02-10 | End: 2021-03-09 | Stop reason: CLARIF

## 2021-02-10 ASSESSMENT — ENCOUNTER SYMPTOMS
EYES NEGATIVE: 1
GASTROINTESTINAL NEGATIVE: 1
COUGH: 1
WHEEZING: 1
ALLERGIC/IMMUNOLOGIC NEGATIVE: 1

## 2021-02-10 NOTE — PROGRESS NOTES
2/10/21     Yahaira Warner    : 1965 Sex: female   Age: 48 y.o. Chief Complaint   Patient presents with    Wheezing     last 2 nights have been bad asthma attacks        Prior to Admission medications    Medication Sig Start Date End Date Taking? Authorizing Provider   omeprazole (PRILOSEC) 40 MG delayed release capsule Take 1 capsule by mouth daily 21  Yes Marin Rios DO   albuterol sulfate HFA (PROAIR HFA) 108 (90 Base) MCG/ACT inhaler Inhale 2 puffs into the lungs every 4 hours as needed for Wheezing or Shortness of Breath 20  Yes Aki Hi DO          HPI: Evaluated today asthmatic bronchitis. Currently with exacerbated symptoms. Addition of Z-Fernando and prednisone again today. Addition of Advair twice daily. Reassess in 4 weeks. Medically otherwise has been stable. Review of Systems   Constitutional: Negative. HENT: Negative. Eyes: Negative. Respiratory: Positive for cough and wheezing. Gastrointestinal: Negative. Endocrine: Negative. Genitourinary: Negative. Musculoskeletal: Negative. Skin: Negative. Allergic/Immunologic: Negative. Neurological: Negative. Hematological: Negative. Psychiatric/Behavioral: Negative. Current Outpatient Medications:     omeprazole (PRILOSEC) 40 MG delayed release capsule, Take 1 capsule by mouth daily, Disp: 30 capsule, Rfl: 2    albuterol sulfate HFA (PROAIR HFA) 108 (90 Base) MCG/ACT inhaler, Inhale 2 puffs into the lungs every 4 hours as needed for Wheezing or Shortness of Breath, Disp: 1 Inhaler, Rfl: 5    Allergies   Allergen Reactions    Codeine Other (See Comments)     Heart raced, felt hot    Doxycycline     Morphine Nausea Only    Demerol Hcl [Meperidine] Palpitations       Social History     Tobacco Use    Smoking status: Never Smoker    Smokeless tobacco: Never Used   Substance Use Topics    Alcohol use: No     Comment: social drinker     Drug use:  No Past Surgical History:   Procedure Laterality Date    APPENDECTOMY  2001    COLONOSCOPY  08/09/2017    ECTOPIC PREGNANCY SURGERY  1995    THYROID SURGERY  1985    partial thyroidectomy     Family History   Problem Relation Age of Onset    High Blood Pressure Mother     Heart Failure Father     Stroke Father     Pacemaker Father     Diabetes Father     COPD Brother      Past Medical History:   Diagnosis Date    Asthma     no recent issues, was released from Dr. Nuñez December, feel it is allergen induced , seasonal    Murmur     h/o, no longer present       Vitals:    02/10/21 0835   BP: 128/84   Pulse: 67   Temp: 99 °F (37.2 °C)   SpO2: 97%     BP Readings from Last 3 Encounters:   02/10/21 128/84   01/25/21 130/82   01/07/21 (!) 140/84        Physical Exam  Vitals signs and nursing note reviewed. Constitutional:       Appearance: She is well-developed. HENT:      Head: Normocephalic. Right Ear: External ear normal.      Left Ear: External ear normal.      Nose: Nose normal.   Eyes:      Conjunctiva/sclera: Conjunctivae normal.      Pupils: Pupils are equal, round, and reactive to light. Neck:      Musculoskeletal: Normal range of motion and neck supple. Cardiovascular:      Rate and Rhythm: Normal rate. Pulmonary:      Breath sounds: Wheezing and rhonchi present. Abdominal:      General: Bowel sounds are normal.      Palpations: Abdomen is soft. Musculoskeletal: Normal range of motion. Skin:     General: Skin is warm and dry. Neurological:      Mental Status: She is alert and oriented to person, place, and time. Psychiatric:         Behavior: Behavior normal.     Persistent asthmatic bronchitis as noted. Treatment as noted reassessment in 4 weeks and sooner if problems. Plan Per Assessment:  There are no diagnoses linked to this encounter. No follow-ups on file.       Abena Hoover DO

## 2021-02-15 ENCOUNTER — TELEPHONE (OUTPATIENT)
Dept: ADMINISTRATIVE | Age: 56
End: 2021-02-15

## 2021-02-15 DIAGNOSIS — R06.02 SOB (SHORTNESS OF BREATH): Primary | ICD-10-CM

## 2021-02-15 NOTE — TELEPHONE ENCOUNTER
Spoke to patient. She has complaints of chest pressure and still having trouble catching her breath. PCP recommending echo. Please advise.

## 2021-02-15 NOTE — TELEPHONE ENCOUNTER
Pt calling to schedule an OV with Dr. Jose Miguel Bergeron per her PCP S/p COVID about a month ago and she states still having issues. No available apts right now with JS. Scheduling advise/recommendations. Thank you.

## 2021-02-25 ENCOUNTER — HOSPITAL ENCOUNTER (OUTPATIENT)
Dept: CARDIOLOGY | Age: 56
Discharge: HOME | End: 2021-02-25
Payer: COMMERCIAL

## 2021-02-25 DIAGNOSIS — R06.02 SOB (SHORTNESS OF BREATH): ICD-10-CM

## 2021-02-25 LAB
LV EF: 60 %
LVEF MODALITY: NORMAL

## 2021-02-25 PROCEDURE — 93306 TTE W/DOPPLER COMPLETE: CPT | Performed by: PSYCHIATRY & NEUROLOGY

## 2021-03-02 ENCOUNTER — TELEPHONE (OUTPATIENT)
Dept: CARDIOLOGY CLINIC | Age: 56
End: 2021-03-02

## 2021-03-02 NOTE — TELEPHONE ENCOUNTER
----- Message from Marcio Villagran MD sent at 3/2/2021  6:30 AM EST -----  Normal ventricular function and mild mitral and tricuspid regurgitation. Continue current care.

## 2021-03-09 ENCOUNTER — OFFICE VISIT (OUTPATIENT)
Dept: PRIMARY CARE CLINIC | Age: 56
End: 2021-03-09
Payer: COMMERCIAL

## 2021-03-09 VITALS
TEMPERATURE: 97.7 F | WEIGHT: 177 LBS | BODY MASS INDEX: 32.37 KG/M2 | DIASTOLIC BLOOD PRESSURE: 78 MMHG | SYSTOLIC BLOOD PRESSURE: 132 MMHG | OXYGEN SATURATION: 98 % | HEART RATE: 61 BPM

## 2021-03-09 DIAGNOSIS — U07.1 COVID-19: ICD-10-CM

## 2021-03-09 DIAGNOSIS — J45.21 MILD INTERMITTENT ASTHMA WITH ACUTE EXACERBATION: ICD-10-CM

## 2021-03-09 DIAGNOSIS — K21.9 GASTROESOPHAGEAL REFLUX DISEASE WITHOUT ESOPHAGITIS: ICD-10-CM

## 2021-03-09 DIAGNOSIS — I34.1 MITRAL VALVE PROLAPSE: ICD-10-CM

## 2021-03-09 DIAGNOSIS — E03.9 ACQUIRED HYPOTHYROIDISM: Primary | Chronic | ICD-10-CM

## 2021-03-09 PROCEDURE — 99214 OFFICE O/P EST MOD 30 MIN: CPT | Performed by: FAMILY MEDICINE

## 2021-03-09 RX ORDER — ALBUTEROL SULFATE 90 UG/1
2 AEROSOL, METERED RESPIRATORY (INHALATION) EVERY 4 HOURS PRN
Qty: 1 INHALER | Refills: 5 | Status: SHIPPED
Start: 2021-03-09 | End: 2021-06-29 | Stop reason: SDUPTHER

## 2021-03-09 ASSESSMENT — ENCOUNTER SYMPTOMS
GASTROINTESTINAL NEGATIVE: 1
EYES NEGATIVE: 1
RESPIRATORY NEGATIVE: 1
ALLERGIC/IMMUNOLOGIC NEGATIVE: 1

## 2021-03-09 NOTE — PROGRESS NOTES
Morphine Nausea Only    Demerol Hcl [Meperidine] Palpitations       Social History     Tobacco Use    Smoking status: Never Smoker    Smokeless tobacco: Never Used   Substance Use Topics    Alcohol use: No     Comment: social drinker     Drug use: No      Past Surgical History:   Procedure Laterality Date    APPENDECTOMY  2001    COLONOSCOPY  08/09/2017    ECTOPIC PREGNANCY SURGERY  1995    THYROID SURGERY  1985    partial thyroidectomy     Family History   Problem Relation Age of Onset    High Blood Pressure Mother     Heart Failure Father     Stroke Father     Pacemaker Father     Diabetes Father     COPD Brother      Past Medical History:   Diagnosis Date    Asthma     no recent issues, was released from Dr. Kevin Aldana, feel it is allergen induced , seasonal    Murmur     h/o, no longer present       Vitals:    03/09/21 0737   BP: 132/78   Pulse: 61   Temp: 97.7 °F (36.5 °C)   SpO2: 98%   Weight: 177 lb (80.3 kg)     BP Readings from Last 3 Encounters:   03/09/21 132/78   02/10/21 128/84   01/25/21 130/82        Physical Exam  Vitals signs and nursing note reviewed. Constitutional:       Appearance: She is well-developed. HENT:      Head: Normocephalic. Right Ear: External ear normal.      Left Ear: External ear normal.      Nose: Nose normal.   Eyes:      Conjunctiva/sclera: Conjunctivae normal.      Pupils: Pupils are equal, round, and reactive to light. Neck:      Musculoskeletal: Normal range of motion and neck supple. Cardiovascular:      Rate and Rhythm: Normal rate. Pulmonary:      Breath sounds: Normal breath sounds. Abdominal:      General: Bowel sounds are normal.      Palpations: Abdomen is soft. Musculoskeletal: Normal range of motion. Skin:     General: Skin is warm and dry. Neurological:      Mental Status: She is alert and oriented to person, place, and time. Psychiatric:         Behavior: Behavior normal.     Today's vitals physical examination stable.   Echo reviewed and stable. Respiratory completely improved. Follow-up with me in June and sooner if problems. Plan Per Assessment:  Valeria Benavides was seen today for asthma. Diagnoses and all orders for this visit:    Acquired hypothyroidism    Mild intermittent asthma with acute exacerbation  -     albuterol sulfate HFA (PROAIR HFA) 108 (90 Base) MCG/ACT inhaler; Inhale 2 puffs into the lungs every 4 hours as needed for Wheezing or Shortness of Breath    Gastroesophageal reflux disease without esophagitis    Mitral valve prolapse    COVID-19            No follow-ups on file. Stefan Sood,     Note was generated with the assistance of voice recognition software. Document was reviewed however may contain grammatical errors.

## 2021-06-24 LAB
ALBUMIN SERPL-MCNC: 4.2 G/DL (ref 3.5–5.2)
ALP BLD-CCNC: 87 U/L (ref 35–104)
ALT SERPL-CCNC: 11 U/L (ref 0–32)
ANION GAP SERPL CALCULATED.3IONS-SCNC: 10 MMOL/L (ref 7–16)
AST SERPL-CCNC: 13 U/L (ref 0–31)
BASOPHILS ABSOLUTE: 0.11 E9/L (ref 0–0.2)
BASOPHILS RELATIVE PERCENT: 1.8 % (ref 0–2)
BILIRUB SERPL-MCNC: 0.5 MG/DL (ref 0–1.2)
BUN BLDV-MCNC: 13 MG/DL (ref 6–20)
CALCIUM SERPL-MCNC: 9.3 MG/DL (ref 8.6–10.2)
CHLORIDE BLD-SCNC: 105 MMOL/L (ref 98–107)
CHOLESTEROL, TOTAL: 187 MG/DL (ref 0–199)
CO2: 27 MMOL/L (ref 22–29)
CREAT SERPL-MCNC: 0.7 MG/DL (ref 0.5–1)
EOSINOPHILS ABSOLUTE: 0.37 E9/L (ref 0.05–0.5)
EOSINOPHILS RELATIVE PERCENT: 6.2 % (ref 0–6)
GFR AFRICAN AMERICAN: >60
GFR NON-AFRICAN AMERICAN: >60 ML/MIN/1.73
GLUCOSE BLD-MCNC: 100 MG/DL (ref 74–99)
HBA1C MFR BLD: 5.5 % (ref 4–5.6)
HCT VFR BLD CALC: 46.6 % (ref 34–48)
HDLC SERPL-MCNC: 54 MG/DL
HEMOGLOBIN: 14.2 G/DL (ref 11.5–15.5)
IMMATURE GRANULOCYTES #: 0.01 E9/L
IMMATURE GRANULOCYTES %: 0.2 % (ref 0–5)
LDL CHOLESTEROL CALCULATED: 121 MG/DL (ref 0–99)
LYMPHOCYTES ABSOLUTE: 1.67 E9/L (ref 1.5–4)
LYMPHOCYTES RELATIVE PERCENT: 28.1 % (ref 20–42)
MCH RBC QN AUTO: 26.2 PG (ref 26–35)
MCHC RBC AUTO-ENTMCNC: 30.5 % (ref 32–34.5)
MCV RBC AUTO: 86.1 FL (ref 80–99.9)
MONOCYTES ABSOLUTE: 0.48 E9/L (ref 0.1–0.95)
MONOCYTES RELATIVE PERCENT: 8.1 % (ref 2–12)
NEUTROPHILS ABSOLUTE: 3.31 E9/L (ref 1.8–7.3)
NEUTROPHILS RELATIVE PERCENT: 55.6 % (ref 43–80)
PDW BLD-RTO: 12.5 FL (ref 11.5–15)
PLATELET # BLD: 449 E9/L (ref 130–450)
PMV BLD AUTO: 10.2 FL (ref 7–12)
POTASSIUM SERPL-SCNC: 4.4 MMOL/L (ref 3.5–5)
RBC # BLD: 5.41 E12/L (ref 3.5–5.5)
SODIUM BLD-SCNC: 142 MMOL/L (ref 132–146)
T4 TOTAL: 9.2 MCG/DL (ref 4.5–11.7)
TOTAL PROTEIN: 7 G/DL (ref 6.4–8.3)
TRIGL SERPL-MCNC: 60 MG/DL (ref 0–149)
TSH SERPL DL<=0.05 MIU/L-ACNC: 1.11 UIU/ML (ref 0.27–4.2)
VLDLC SERPL CALC-MCNC: 12 MG/DL
WBC # BLD: 6 E9/L (ref 4.5–11.5)

## 2021-06-29 ENCOUNTER — OFFICE VISIT (OUTPATIENT)
Dept: PRIMARY CARE CLINIC | Age: 56
End: 2021-06-29
Payer: COMMERCIAL

## 2021-06-29 VITALS
HEIGHT: 62 IN | WEIGHT: 178 LBS | TEMPERATURE: 98.1 F | SYSTOLIC BLOOD PRESSURE: 128 MMHG | DIASTOLIC BLOOD PRESSURE: 78 MMHG | HEART RATE: 67 BPM | BODY MASS INDEX: 32.76 KG/M2 | OXYGEN SATURATION: 98 %

## 2021-06-29 DIAGNOSIS — J45.21 MILD INTERMITTENT ASTHMA WITH ACUTE EXACERBATION: ICD-10-CM

## 2021-06-29 DIAGNOSIS — I34.1 MITRAL VALVE PROLAPSE: ICD-10-CM

## 2021-06-29 DIAGNOSIS — E03.9 ACQUIRED HYPOTHYROIDISM: Primary | Chronic | ICD-10-CM

## 2021-06-29 DIAGNOSIS — R91.8 PULMONARY NODULES: ICD-10-CM

## 2021-06-29 DIAGNOSIS — M25.562 ACUTE PAIN OF LEFT KNEE: ICD-10-CM

## 2021-06-29 PROCEDURE — 99396 PREV VISIT EST AGE 40-64: CPT | Performed by: FAMILY MEDICINE

## 2021-06-29 RX ORDER — ALBUTEROL SULFATE 90 UG/1
2 AEROSOL, METERED RESPIRATORY (INHALATION) EVERY 4 HOURS PRN
Qty: 3 INHALER | Refills: 3 | Status: SHIPPED
Start: 2021-06-29 | End: 2021-10-07 | Stop reason: SDUPTHER

## 2021-06-29 ASSESSMENT — ENCOUNTER SYMPTOMS
ALLERGIC/IMMUNOLOGIC NEGATIVE: 1
RESPIRATORY NEGATIVE: 1
EYES NEGATIVE: 1
GASTROINTESTINAL NEGATIVE: 1

## 2021-06-29 NOTE — PROGRESS NOTES
21     Torsten Elizondo    : 1965 Sex: female   Age: 48 y.o. Chief Complaint   Patient presents with    Annual Exam     had ekg and echo completed     Discuss Labs    Knee Pain     left knee pain aggravated has been kick boxing       Prior to Admission medications    Medication Sig Start Date End Date Taking? Authorizing Provider   ADVAIR DISKUS 250-50 MCG/DOSE AEPB Inhale 1 puff into the lungs every 12 hours 21 Yes Shad Rios DO   albuterol sulfate HFA (PROAIR HFA) 108 (90 Base) MCG/ACT inhaler Inhale 2 puffs into the lungs every 4 hours as needed for Wheezing or Shortness of Breath 21  Yes Josie Babcock DO          HPI: Issa Wilson is seen this morning asthmatic bronchitis hypothyroid pulmonary nodules mitral valve prolapse knee pain. Health maintenance physical.  Medically overall has been well. Asthma good control. Last CT of the chest for pulmonary nodules was 2020 and we will repeat in September. Stable or unchanged findings would merit no further follow-up. Continues to follow with pulmonary as well. Lab studies today were stable and work wellness forms completed. Left knee pain has been persistent and we will follow-up x-rays to rule out bony abnormality and probable need for MRI study. Chronic discomfort. Review of Systems   Constitutional: Negative. HENT: Negative. Eyes: Negative. Respiratory: Negative. Asthmatic history. Stable. Gastrointestinal: Negative. Endocrine: Negative. Genitourinary: Negative. Musculoskeletal: Positive for arthralgias. Skin: Negative. Allergic/Immunologic: Negative. Neurological: Negative. Hematological: Negative. Psychiatric/Behavioral: Negative.                Current Outpatient Medications:     ADVAIR DISKUS 250-50 MCG/DOSE AEPB, Inhale 1 puff into the lungs every 12 hours, Disp: 3 Inhaler, Rfl: 3    albuterol sulfate HFA (PROAIR HFA) 108 (90 Base) MCG/ACT motion. Cervical back: Normal range of motion and neck supple. Skin:     General: Skin is warm and dry. Neurological:      Mental Status: She is alert and oriented to person, place, and time. Psychiatric:         Behavior: Behavior normal.     Present vitals physical examination stable. Labs reviewed and stable. Continue with diet and exercise. Follow-up visit 3 months and sooner if problems. CT scan of the chest prior.         Lab Results   Component Value Date    TSH 1.110 06/24/2021    TSH 1.360 06/04/2020    F4WHAXO 9.2 06/24/2021    R3RMTML 9.4 06/04/2020    T4FREE 1.66 06/14/2018    T4FREE 1.48 12/14/2017     Lab Results   Component Value Date    CHOL 187 06/24/2021    CHOL 203 (H) 06/04/2020     Lab Results   Component Value Date    TRIG 60 06/24/2021    TRIG 91 06/04/2020     Lab Results   Component Value Date    HDL 54 06/24/2021    HDL 53 06/04/2020     No results found for: [facility]  Lab Results   Component Value Date    LABVLDL 12 06/24/2021    LABVLDL 18 06/04/2020     No results found for: [facility]  Lab Results   Component Value Date    WBC 6.0 06/24/2021    HGB 14.2 06/24/2021    HCT 46.6 06/24/2021    MCV 86.1 06/24/2021     06/24/2021    LYMPHOPCT 28.1 06/24/2021    RBC 5.41 06/24/2021    MCH 26.2 06/24/2021    MCHC 30.5 (L) 06/24/2021    RDW 12.5 06/24/2021     Lab Results   Component Value Date     06/24/2021    K 4.4 06/24/2021     06/24/2021    CO2 27 06/24/2021    BUN 13 06/24/2021    CREATININE 0.7 06/24/2021    GLUCOSE 100 (H) 06/24/2021    CALCIUM 9.3 06/24/2021    PROT 7.0 06/24/2021    LABALBU 4.2 06/24/2021    BILITOT 0.5 06/24/2021    ALKPHOS 87 06/24/2021    AST 13 06/24/2021    ALT 11 06/24/2021    LABGLOM >60 06/24/2021    GFRAA >60 06/24/2021      No results found for: PSA, PSADIA   Lab Results   Component Value Date    LABA1C 5.5 06/24/2021    LABA1C 5.3 06/08/2020    LABA1C 5.8 12/14/2017     No results found for: EAG     Plan Per Assessment:  Iain was seen today for annual exam, discuss labs and knee pain. Diagnoses and all orders for this visit:    Mild intermittent asthma with acute exacerbation  -     albuterol sulfate HFA (PROAIR HFA) 108 (90 Base) MCG/ACT inhaler; Inhale 2 puffs into the lungs every 4 hours as needed for Wheezing or Shortness of Breath    Other orders  -     ADVAIR DISKUS 250-50 MCG/DOSE AEPB; Inhale 1 puff into the lungs every 12 hours            No follow-ups on file. Stuart Fernandez,     Note was generated with the assistance of voice recognition software. Document was reviewed however may contain grammatical errors.

## 2021-09-22 ENCOUNTER — HOSPITAL ENCOUNTER (OUTPATIENT)
Dept: CT IMAGING | Age: 56
Discharge: HOME | End: 2021-09-24
Payer: COMMERCIAL

## 2021-09-22 DIAGNOSIS — R91.8 PULMONARY NODULES: ICD-10-CM

## 2021-09-22 PROCEDURE — 71250 CT THORAX DX C-: CPT

## 2021-10-07 ENCOUNTER — OFFICE VISIT (OUTPATIENT)
Dept: PRIMARY CARE CLINIC | Age: 56
End: 2021-10-07
Payer: COMMERCIAL

## 2021-10-07 VITALS
HEART RATE: 71 BPM | SYSTOLIC BLOOD PRESSURE: 136 MMHG | BODY MASS INDEX: 32.37 KG/M2 | TEMPERATURE: 99 F | DIASTOLIC BLOOD PRESSURE: 84 MMHG | OXYGEN SATURATION: 97 % | WEIGHT: 177 LBS

## 2021-10-07 DIAGNOSIS — I34.1 MITRAL VALVE PROLAPSE: ICD-10-CM

## 2021-10-07 DIAGNOSIS — M85.80 OSTEOPENIA, UNSPECIFIED LOCATION: ICD-10-CM

## 2021-10-07 DIAGNOSIS — J45.21 MILD INTERMITTENT ASTHMA WITH ACUTE EXACERBATION: ICD-10-CM

## 2021-10-07 DIAGNOSIS — R91.8 PULMONARY NODULES: ICD-10-CM

## 2021-10-07 DIAGNOSIS — J45.30 MILD PERSISTENT ASTHMA WITHOUT COMPLICATION: Chronic | ICD-10-CM

## 2021-10-07 DIAGNOSIS — E03.9 ACQUIRED HYPOTHYROIDISM: Primary | Chronic | ICD-10-CM

## 2021-10-07 DIAGNOSIS — K21.9 GASTROESOPHAGEAL REFLUX DISEASE WITHOUT ESOPHAGITIS: ICD-10-CM

## 2021-10-07 PROCEDURE — 99214 OFFICE O/P EST MOD 30 MIN: CPT | Performed by: FAMILY MEDICINE

## 2021-10-07 RX ORDER — ALBUTEROL SULFATE 90 UG/1
2 AEROSOL, METERED RESPIRATORY (INHALATION) EVERY 4 HOURS PRN
Qty: 3 EACH | Refills: 3 | Status: SHIPPED
Start: 2021-10-07 | End: 2022-01-18 | Stop reason: SDUPTHER

## 2021-10-07 ASSESSMENT — ENCOUNTER SYMPTOMS
ALLERGIC/IMMUNOLOGIC NEGATIVE: 1
EYES NEGATIVE: 1
GASTROINTESTINAL NEGATIVE: 1
RESPIRATORY NEGATIVE: 1

## 2021-10-07 NOTE — PROGRESS NOTES
10/7/21     Dana Collins    : 1965 Sex: female   Age: 48 y.o. Chief Complaint   Patient presents with    Results     review of CT scan    Other     needs antibody test for covid       Prior to Admission medications    Medication Sig Start Date End Date Taking? Authorizing Provider   fluticasone-salmeterol (ADVAIR DISKUS) 250-50 MCG/DOSE AEPB Inhale 1 puff into the lungs every 12 hours 10/7/21 11/6/21 Yes Shad Rios DO   albuterol sulfate HFA (PROAIR HFA) 108 (90 Base) MCG/ACT inhaler Inhale 2 puffs into the lungs every 4 hours as needed for Wheezing or Shortness of Breath 10/7/21  Yes Baljinder Jauregui DO          HPI: Patient seen this morning asthmatic bronchitis hypothyroid reflux disease mitral valve disease pulmonary nodules osteopenia. Discussed the possibility of bisphosphonates and she prefers to stay just with calcium vitamin D understands her replacement agreeable to repeat bone density next year. Pulmonary nodules present and recommending reevaluation with Dr. Anatoliy Chambers and then will follow-up. Physical planned for  blood work prior. Review of Systems   Constitutional: Negative. HENT: Negative. Eyes: Negative. Respiratory: Negative. Gastrointestinal: Negative. Endocrine: Negative. Genitourinary: Negative. Musculoskeletal: Negative. Skin: Negative. Allergic/Immunologic: Negative. Neurological: Negative. Hematological: Negative. Psychiatric/Behavioral: Negative. Today systems review overall stable meds as prescribed.         Current Outpatient Medications:     fluticasone-salmeterol (ADVAIR DISKUS) 250-50 MCG/DOSE AEPB, Inhale 1 puff into the lungs every 12 hours, Disp: 3 each, Rfl: 3    albuterol sulfate HFA (PROAIR HFA) 108 (90 Base) MCG/ACT inhaler, Inhale 2 puffs into the lungs every 4 hours as needed for Wheezing or Shortness of Breath, Disp: 3 each, Rfl: 3    Allergies   Allergen Reactions    Codeine Other (See Comments)     Heart raced, felt hot    Doxycycline     Morphine Nausea Only    Demerol Hcl [Meperidine] Palpitations       Social History     Tobacco Use    Smoking status: Never Smoker    Smokeless tobacco: Never Used   Vaping Use    Vaping Use: Never used   Substance Use Topics    Alcohol use: No     Comment: social drinker     Drug use: No      Past Surgical History:   Procedure Laterality Date    APPENDECTOMY  2001    COLONOSCOPY  08/09/2017    ECTOPIC PREGNANCY SURGERY  1995    THYROID SURGERY  1985    partial thyroidectomy     Family History   Problem Relation Age of Onset    High Blood Pressure Mother     Heart Failure Father     Stroke Father     Pacemaker Father     Diabetes Father     COPD Brother      Past Medical History:   Diagnosis Date    Asthma     no recent issues, was released from Dr. Mariah Rust, feel it is allergen induced , seasonal    Murmur     h/o, no longer present       Vitals:    10/07/21 0648 10/07/21 0650   BP: (!) 140/80 136/84   Pulse: 71    Temp: 99 °F (37.2 °C)    SpO2: 97%    Weight: 177 lb (80.3 kg)      BP Readings from Last 3 Encounters:   10/07/21 136/84   06/29/21 128/78   03/09/21 132/78        Physical Exam  Vitals and nursing note reviewed. Constitutional:       Appearance: She is well-developed. HENT:      Head: Normocephalic. Right Ear: External ear normal.      Left Ear: External ear normal.      Nose: Nose normal.   Eyes:      Conjunctiva/sclera: Conjunctivae normal.      Pupils: Pupils are equal, round, and reactive to light. Cardiovascular:      Rate and Rhythm: Normal rate. Pulmonary:      Breath sounds: Normal breath sounds. Abdominal:      General: Bowel sounds are normal.      Palpations: Abdomen is soft. Musculoskeletal:         General: Normal range of motion. Cervical back: Normal range of motion and neck supple. Skin:     General: Skin is warm and dry.    Neurological:      Mental Status: She is alert and oriented to person, place, and time. Psychiatric:         Behavior: Behavior normal.        Present vitals physical examination stable. I will sit tight with present meds and care. Reassessment June 2022 and sooner if problems. Plan Per Assessment:  Iain was seen today for results and other. Diagnoses and all orders for this visit:    Acquired hypothyroidism  -     CBC Auto Differential; Future  -     Comprehensive Metabolic Panel; Future  -     Lipid Panel; Future  -     T4; Future  -     TSH without Reflex; Future    Mild intermittent asthma with acute exacerbation  -     albuterol sulfate HFA (PROAIR HFA) 108 (90 Base) MCG/ACT inhaler; Inhale 2 puffs into the lungs every 4 hours as needed for Wheezing or Shortness of Breath    Mild persistent asthma without complication  -     [facility] ([facility]) - Janeth Sanders MD, [facility]  -     CBC Auto Differential; Future  -     Comprehensive Metabolic Panel; Future  -     Lipid Panel; Future  -     T4; Future  -     TSH without Reflex; Future    Gastroesophageal reflux disease without esophagitis  -     CBC Auto Differential; Future  -     Comprehensive Metabolic Panel; Future  -     Lipid Panel; Future  -     T4; Future  -     TSH without Reflex; Future    Mitral valve prolapse    Pulmonary nodules  -     [facility] ([facility]) - Janeth Sanders MD, [facility]  -     CBC Auto Differential; Future  -     Comprehensive Metabolic Panel; Future  -     Lipid Panel; Future  -     T4; Future  -     TSH without Reflex; Future    Osteopenia, unspecified location  -     CBC Auto Differential; Future  -     Comprehensive Metabolic Panel; Future  -     Lipid Panel; Future  -     T4; Future  -     TSH without Reflex; Future    Other orders  -     fluticasone-salmeterol (ADVAIR DISKUS) 250-50 MCG/DOSE AEPB; Inhale 1 puff into the lungs every 12 hours            No follow-ups on file.       Morro Paz DO    Note was generated with the assistance of voice recognition software. Document was reviewed however may contain grammatical errors.

## 2022-01-05 ENCOUNTER — HOSPITAL ENCOUNTER (OUTPATIENT)
Dept: GENERAL RADIOLOGY | Age: 57
Discharge: HOME | End: 2022-01-07
Payer: COMMERCIAL

## 2022-01-05 DIAGNOSIS — Z12.31 VISIT FOR SCREENING MAMMOGRAM: ICD-10-CM

## 2022-01-05 PROCEDURE — 77063 BREAST TOMOSYNTHESIS BI: CPT

## 2022-01-18 ENCOUNTER — OFFICE VISIT (OUTPATIENT)
Dept: PRIMARY CARE CLINIC | Age: 57
End: 2022-01-18
Payer: COMMERCIAL

## 2022-01-18 VITALS
HEART RATE: 70 BPM | SYSTOLIC BLOOD PRESSURE: 128 MMHG | OXYGEN SATURATION: 95 % | TEMPERATURE: 97.7 F | DIASTOLIC BLOOD PRESSURE: 72 MMHG

## 2022-01-18 DIAGNOSIS — J45.21 MILD INTERMITTENT ASTHMA WITH ACUTE EXACERBATION: Primary | ICD-10-CM

## 2022-01-18 PROCEDURE — 99213 OFFICE O/P EST LOW 20 MIN: CPT | Performed by: PHYSICIAN ASSISTANT

## 2022-01-18 RX ORDER — ALBUTEROL SULFATE 90 UG/1
2 AEROSOL, METERED RESPIRATORY (INHALATION) EVERY 4 HOURS PRN
Qty: 3 EACH | Refills: 0 | Status: SHIPPED | OUTPATIENT
Start: 2022-01-18 | End: 2022-07-19 | Stop reason: SDUPTHER

## 2022-01-18 RX ORDER — AZITHROMYCIN 250 MG/1
250 TABLET, FILM COATED ORAL SEE ADMIN INSTRUCTIONS
Qty: 6 TABLET | Refills: 0 | Status: SHIPPED | OUTPATIENT
Start: 2022-01-18 | End: 2022-01-23

## 2022-01-18 RX ORDER — PREDNISONE 20 MG/1
20 TABLET ORAL 2 TIMES DAILY
Qty: 10 TABLET | Refills: 0 | Status: SHIPPED | OUTPATIENT
Start: 2022-01-18 | End: 2022-01-23

## 2022-01-18 NOTE — PROGRESS NOTES
Diabetes in her father; Heart Failure in her father; High Blood Pressure in her mother; Pacemaker in her father; Stroke in her father. Allergies: Codeine, Doxycycline, Morphine, and Demerol hcl [meperidine]    Physical Exam         VS:  /72   Pulse 70   Temp 97.7 °F (36.5 °C)   LMP 05/31/2017   SpO2 95%    Oxygen Saturation Interpretation: Normal.    Constitutional:  Alert, development consistent with age. Afebrile, in NAD. Ears:  External Ears: Normal bilateral pinna. TM's & External Canals: TM's normal bilaterally without perforation. Canals without erythema or drainage. Nose:   There is no obvious septal defect. Turbinates are normal.   Mouth:  Moist bucca mucosa and normal tongue. Throat: Mild posterior pharyngeal erythema without exudates or lesions. Neck:  Supple. There is no obvious adenopathy or neck tenderness. Lungs:   Breath sounds: Normal chest expansion and breath sounds noted throughout. No wheezes, rales, or rhonchi noted. No labored breathing. Heart:  Regular rate and rhythm, normal heart sounds, without pathological murmurs, ectopy, gallops, or rubs. Abdomen:  Soft, nontender, good bowel sounds. No firm or pulsatile mass. Skin:  Normal turgor. Warm, dry, without visible rash. Neurological:  Oriented. Motor functions intact. Lab / Imaging Results   (All laboratory and radiology results have been personally reviewed by myself)  Labs:  No results found for this visit on 01/18/22. Imaging: All Radiology results interpreted by Radiologist unless otherwise noted. No orders to display         Assessment / Plan     Impression(s):  1. Mild intermittent asthma with acute exacerbation      Disposition:    Rx's were discussed including indications, adverse effects, pt agrees and understands use(prednisone to start, Zpak if Sx worsen). Recommended fluids and rest. OTC Ibuprofen/Tylenol if needed for pain/fevers. OTC Mucinex. Refilled Proair as well. Continue on the Advair. FU if Sx persist or worsen, if severe or worrisome-go to ER. Otherwise FU for routine care.

## 2022-06-21 DIAGNOSIS — R91.8 PULMONARY NODULES: ICD-10-CM

## 2022-06-21 DIAGNOSIS — M85.80 OSTEOPENIA, UNSPECIFIED LOCATION: ICD-10-CM

## 2022-06-21 DIAGNOSIS — E03.9 ACQUIRED HYPOTHYROIDISM: Chronic | ICD-10-CM

## 2022-06-21 DIAGNOSIS — J45.30 MILD PERSISTENT ASTHMA WITHOUT COMPLICATION: Chronic | ICD-10-CM

## 2022-06-21 DIAGNOSIS — K21.9 GASTROESOPHAGEAL REFLUX DISEASE WITHOUT ESOPHAGITIS: ICD-10-CM

## 2022-06-21 LAB
ALBUMIN SERPL-MCNC: 4.4 G/DL (ref 3.5–5.2)
ALP BLD-CCNC: 99 U/L (ref 35–104)
ALT SERPL-CCNC: 12 U/L (ref 0–32)
ANION GAP SERPL CALCULATED.3IONS-SCNC: 15 MMOL/L (ref 7–16)
AST SERPL-CCNC: 17 U/L (ref 0–31)
BASOPHILS ABSOLUTE: 0.1 E9/L (ref 0–0.2)
BASOPHILS RELATIVE PERCENT: 1.5 % (ref 0–2)
BILIRUB SERPL-MCNC: 0.7 MG/DL (ref 0–1.2)
BUN BLDV-MCNC: 16 MG/DL (ref 6–20)
CALCIUM SERPL-MCNC: 9.3 MG/DL (ref 8.6–10.2)
CHLORIDE BLD-SCNC: 102 MMOL/L (ref 98–107)
CHOLESTEROL, TOTAL: 201 MG/DL (ref 0–199)
CO2: 23 MMOL/L (ref 22–29)
CREAT SERPL-MCNC: 0.7 MG/DL (ref 0.5–1)
EOSINOPHILS ABSOLUTE: 0.33 E9/L (ref 0.05–0.5)
EOSINOPHILS RELATIVE PERCENT: 5.1 % (ref 0–6)
GFR AFRICAN AMERICAN: >60
GFR NON-AFRICAN AMERICAN: >60 ML/MIN/1.73
GLUCOSE BLD-MCNC: 100 MG/DL (ref 74–99)
HCT VFR BLD CALC: 45 % (ref 34–48)
HDLC SERPL-MCNC: 56 MG/DL
HEMOGLOBIN: 14.3 G/DL (ref 11.5–15.5)
IMMATURE GRANULOCYTES #: 0.02 E9/L
IMMATURE GRANULOCYTES %: 0.3 % (ref 0–5)
LDL CHOLESTEROL CALCULATED: 128 MG/DL (ref 0–99)
LYMPHOCYTES ABSOLUTE: 1.61 E9/L (ref 1.5–4)
LYMPHOCYTES RELATIVE PERCENT: 24.8 % (ref 20–42)
MCH RBC QN AUTO: 27 PG (ref 26–35)
MCHC RBC AUTO-ENTMCNC: 31.8 % (ref 32–34.5)
MCV RBC AUTO: 84.9 FL (ref 80–99.9)
MONOCYTES ABSOLUTE: 0.47 E9/L (ref 0.1–0.95)
MONOCYTES RELATIVE PERCENT: 7.2 % (ref 2–12)
NEUTROPHILS ABSOLUTE: 3.97 E9/L (ref 1.8–7.3)
NEUTROPHILS RELATIVE PERCENT: 61.1 % (ref 43–80)
PDW BLD-RTO: 12.6 FL (ref 11.5–15)
PLATELET # BLD: 435 E9/L (ref 130–450)
PMV BLD AUTO: 10.2 FL (ref 7–12)
POTASSIUM SERPL-SCNC: 4.5 MMOL/L (ref 3.5–5)
RBC # BLD: 5.3 E12/L (ref 3.5–5.5)
SODIUM BLD-SCNC: 140 MMOL/L (ref 132–146)
T4 TOTAL: 10.5 MCG/DL (ref 4.5–11.7)
TOTAL PROTEIN: 7.1 G/DL (ref 6.4–8.3)
TRIGL SERPL-MCNC: 84 MG/DL (ref 0–149)
TSH SERPL DL<=0.05 MIU/L-ACNC: 0.99 UIU/ML (ref 0.27–4.2)
VLDLC SERPL CALC-MCNC: 17 MG/DL
WBC # BLD: 6.5 E9/L (ref 4.5–11.5)

## 2022-06-30 ENCOUNTER — OFFICE VISIT (OUTPATIENT)
Dept: PRIMARY CARE CLINIC | Age: 57
End: 2022-06-30
Payer: COMMERCIAL

## 2022-06-30 VITALS
OXYGEN SATURATION: 98 % | SYSTOLIC BLOOD PRESSURE: 120 MMHG | WEIGHT: 175 LBS | TEMPERATURE: 98 F | HEART RATE: 61 BPM | BODY MASS INDEX: 32.01 KG/M2 | DIASTOLIC BLOOD PRESSURE: 80 MMHG

## 2022-06-30 DIAGNOSIS — J45.30 MILD PERSISTENT ASTHMA WITHOUT COMPLICATION: Chronic | ICD-10-CM

## 2022-06-30 DIAGNOSIS — M85.80 OSTEOPENIA, UNSPECIFIED LOCATION: ICD-10-CM

## 2022-06-30 DIAGNOSIS — M17.12 PRIMARY OSTEOARTHRITIS OF LEFT KNEE: ICD-10-CM

## 2022-06-30 DIAGNOSIS — Z00.00 ANNUAL PHYSICAL EXAM: Primary | ICD-10-CM

## 2022-06-30 PROCEDURE — 99396 PREV VISIT EST AGE 40-64: CPT | Performed by: FAMILY MEDICINE

## 2022-06-30 PROCEDURE — 93000 ELECTROCARDIOGRAM COMPLETE: CPT | Performed by: FAMILY MEDICINE

## 2022-06-30 ASSESSMENT — PATIENT HEALTH QUESTIONNAIRE - PHQ9
SUM OF ALL RESPONSES TO PHQ QUESTIONS 1-9: 0
SUM OF ALL RESPONSES TO PHQ9 QUESTIONS 1 & 2: 0
2. FEELING DOWN, DEPRESSED OR HOPELESS: 0
SUM OF ALL RESPONSES TO PHQ QUESTIONS 1-9: 0
1. LITTLE INTEREST OR PLEASURE IN DOING THINGS: 0

## 2022-06-30 ASSESSMENT — ENCOUNTER SYMPTOMS
RESPIRATORY NEGATIVE: 1
ALLERGIC/IMMUNOLOGIC NEGATIVE: 1
GASTROINTESTINAL NEGATIVE: 1
EYES NEGATIVE: 1

## 2022-06-30 NOTE — PROGRESS NOTES
22     Jose Alfredo Kate    : 1965 Sex: female   Age: 55 y.o. Chief Complaint   Patient presents with    Annual Exam    Discuss Labs       Prior to Admission medications    Medication Sig Start Date End Date Taking? Authorizing Provider   fluticasone (FLONASE) 50 MCG/ACT nasal spray 2 sprays by Each Nostril route daily 22  Yes Shad Jhaveri MD   albuterol sulfate HFA (PROAIR HFA) 108 (90 Base) MCG/ACT inhaler Inhale 2 puffs into the lungs every 4 hours as needed for Wheezing or Shortness of Breath 22  Yes Abel Garcia PA-C   fluticasone-salmeterol (ADVAIR DISKUS) 250-50 MCG/DOSE AEPB Inhale 1 puff into the lungs every 12 hours 10/7/21 6/30/22 Yes Dat Don DO          HPI: Patient presents this morning health maintenance physical.  Osteoarthritic disease left knee and referral made today for Dr. Rosalba Cruz. Asthma currently good control recent evaluation by pulmonary stable. Dr. Zia Souza. Review of Systems   Constitutional: Negative. HENT: Negative. Eyes: Negative. Respiratory: Negative. Gastrointestinal: Negative. Endocrine: Negative. Genitourinary: Negative. Musculoskeletal: Negative. Skin: Negative. Allergic/Immunologic: Negative. Neurological: Negative. Hematological: Negative. Psychiatric/Behavioral: Negative. Today systems review stable meds as prescribed.           Current Outpatient Medications:     fluticasone (FLONASE) 50 MCG/ACT nasal spray, 2 sprays by Each Nostril route daily, Disp: 48 g, Rfl: 1    albuterol sulfate HFA (PROAIR HFA) 108 (90 Base) MCG/ACT inhaler, Inhale 2 puffs into the lungs every 4 hours as needed for Wheezing or Shortness of Breath, Disp: 3 each, Rfl: 0    fluticasone-salmeterol (ADVAIR DISKUS) 250-50 MCG/DOSE AEPB, Inhale 1 puff into the lungs every 12 hours, Disp: 3 each, Rfl: 3    Allergies   Allergen Reactions    Codeine Other (See Comments)     Heart raced, felt hot    Doxycycline     Morphine Nausea Only    Demerol Hcl [Meperidine] Palpitations       Social History     Tobacco Use    Smoking status: Never Smoker    Smokeless tobacco: Never Used   Vaping Use    Vaping Use: Never used   Substance Use Topics    Alcohol use: No     Comment: social drinker     Drug use: No      Past Surgical History:   Procedure Laterality Date    APPENDECTOMY  2001    COLONOSCOPY  08/09/2017    ECTOPIC PREGNANCY SURGERY  1995    THYROID SURGERY  1985    partial thyroidectomy     Family History   Problem Relation Age of Onset    High Blood Pressure Mother     Heart Failure Father     Stroke Father     Pacemaker Father     Diabetes Father     COPD Brother     Breast Cancer Paternal Aunt      Past Medical History:   Diagnosis Date    Asthma     no recent issues, was released from Dr. Seema Mansfield, feel it is allergen induced , seasonal    Murmur     h/o, no longer present       Vitals:    06/30/22 0641   BP: 120/80   Pulse: 61   Temp: 98 °F (36.7 °C)   SpO2: 98%   Weight: 175 lb (79.4 kg)     BP Readings from Last 3 Encounters:   06/30/22 120/80   02/09/22 136/72   01/18/22 128/72        Physical Exam  Vitals and nursing note reviewed. Constitutional:       Appearance: She is well-developed. HENT:      Head: Normocephalic. Right Ear: External ear normal.      Left Ear: External ear normal.      Nose: Nose normal.   Eyes:      Conjunctiva/sclera: Conjunctivae normal.      Pupils: Pupils are equal, round, and reactive to light. Cardiovascular:      Rate and Rhythm: Normal rate. Pulmonary:      Breath sounds: Normal breath sounds. Abdominal:      General: Bowel sounds are normal.      Palpations: Abdomen is soft. Musculoskeletal:         General: Normal range of motion. Cervical back: Normal range of motion and neck supple. Skin:     General: Skin is warm and dry. Neurological:      Mental Status: She is alert and oriented to person, place, and time.    Psychiatric: exam  -     EKG 12 lead; Future  -     EKG 12 lead    Primary osteoarthritis of left knee  -     [facility] - Juan Jose Duran MD, Orthopaed[facility], [place]    Mild persistent asthma without complication    Osteopenia, unspecified location            Return in about 1 year (around 6/30/2023). Curlie Link, DO    Note was generated with the assistance of voice recognition software. Document was reviewed however may contain grammatical errors.

## 2022-07-19 DIAGNOSIS — J45.21 MILD INTERMITTENT ASTHMA WITH ACUTE EXACERBATION: ICD-10-CM

## 2022-07-19 RX ORDER — ALBUTEROL SULFATE 90 UG/1
2 AEROSOL, METERED RESPIRATORY (INHALATION) EVERY 4 HOURS PRN
Qty: 3 EACH | Refills: 0 | Status: SHIPPED | OUTPATIENT
Start: 2022-07-19

## 2022-07-30 PROBLEM — Z00.00 ANNUAL PHYSICAL EXAM: Status: RESOLVED | Noted: 2022-06-30 | Resolved: 2022-07-30

## 2023-01-18 ENCOUNTER — HOSPITAL ENCOUNTER (OUTPATIENT)
Dept: GENERAL RADIOLOGY | Age: 58
Discharge: HOME | End: 2023-01-20
Payer: COMMERCIAL

## 2023-01-18 DIAGNOSIS — Z13.820 ENCOUNTER FOR IMAGING TO ASSESS OSTEOPOROSIS: ICD-10-CM

## 2023-01-18 DIAGNOSIS — Z12.31 VISIT FOR SCREENING MAMMOGRAM: ICD-10-CM

## 2023-01-18 PROCEDURE — 77063 BREAST TOMOSYNTHESIS BI: CPT

## 2023-01-18 PROCEDURE — 77080 DXA BONE DENSITY AXIAL: CPT

## 2023-04-27 ENCOUNTER — HOSPITAL ENCOUNTER (OUTPATIENT)
Dept: GENERAL RADIOLOGY | Age: 58
Discharge: HOME | End: 2023-04-29
Payer: COMMERCIAL

## 2023-04-27 DIAGNOSIS — R92.2 DENSE BREASTS: ICD-10-CM

## 2023-04-27 PROCEDURE — 76641 ULTRASOUND BREAST COMPLETE: CPT

## 2023-05-31 ENCOUNTER — TELEPHONE (OUTPATIENT)
Dept: PRIMARY CARE CLINIC | Age: 58
End: 2023-05-31

## 2023-05-31 DIAGNOSIS — D72.10 EOSINOPHILIA, UNSPECIFIED TYPE: Primary | ICD-10-CM

## 2023-05-31 DIAGNOSIS — M85.80 OSTEOPENIA, UNSPECIFIED LOCATION: ICD-10-CM

## 2023-05-31 DIAGNOSIS — E55.9 VITAMIN D DEFICIENCY: ICD-10-CM

## 2023-05-31 DIAGNOSIS — R73.01 ELEVATED FASTING GLUCOSE: ICD-10-CM

## 2023-05-31 NOTE — TELEPHONE ENCOUNTER
Patient wants to have her yearly blood work   done including thyroid and vitamin D. She needs an order for this.

## 2023-05-31 NOTE — TELEPHONE ENCOUNTER
----- Message from Byron Torres sent at 5/31/2023  1:57 PM EDT -----  Subject: Referral Request    Reason for referral request? Patient wants to have her yearly blood work   done including thyroid and vitamin D. She needs an order for this. Please   mail it to her house. Provider patient wants to be referred to(if known):     Provider Phone Number(if known):     Additional Information for Provider?   ---------------------------------------------------------------------------  --------------  [id number] [employer]    [phone]; OK to leave message on voicemail  ---------------------------------------------------------------------------  --------------

## 2023-09-21 DIAGNOSIS — D72.10 EOSINOPHILIA, UNSPECIFIED TYPE: ICD-10-CM

## 2023-09-21 DIAGNOSIS — E55.9 VITAMIN D DEFICIENCY: ICD-10-CM

## 2023-09-21 DIAGNOSIS — R73.01 ELEVATED FASTING GLUCOSE: ICD-10-CM

## 2023-09-21 DIAGNOSIS — M85.80 OSTEOPENIA, UNSPECIFIED LOCATION: ICD-10-CM

## 2023-09-21 LAB
ALBUMIN SERPL-MCNC: 4.4 G/DL
ALP BLD-CCNC: 96 U/L
ALT SERPL-CCNC: 15 U/L
ANION GAP SERPL CALCULATED.3IONS-SCNC: NORMAL MMOL/L
AST SERPL-CCNC: 12 U/L
AVERAGE GLUCOSE: NORMAL
BASOPHILS ABSOLUTE: 0.09 /ΜL
BASOPHILS RELATIVE PERCENT: 1.4 %
BILIRUB SERPL-MCNC: 0.6 MG/DL (ref 0.1–1.4)
BUN BLDV-MCNC: 13 MG/DL
CALCIUM SERPL-MCNC: 9.2 MG/DL
CHLORIDE BLD-SCNC: 105 MMOL/L
CHOLESTEROL, TOTAL: 193 MG/DL
CHOLESTEROL/HDL RATIO: 3.4
CO2: 25 MMOL/L
CREAT SERPL-MCNC: 0.6 MG/DL
EGFR: 105
EOSINOPHILS ABSOLUTE: 0.52 /ΜL
EOSINOPHILS RELATIVE PERCENT: 8.2 %
GLUCOSE BLD-MCNC: 105 MG/DL
HBA1C MFR BLD: 5.7 %
HCT VFR BLD CALC: 43.1 % (ref 36–46)
HDLC SERPL-MCNC: 57 MG/DL (ref 35–70)
HEMOGLOBIN: 14.2 G/DL (ref 12–16)
LDL CHOLESTEROL CALCULATED: 115 MG/DL (ref 0–160)
LYMPHOCYTES ABSOLUTE: 1.75 /ΜL
LYMPHOCYTES RELATIVE PERCENT: 27.5 %
MCH RBC QN AUTO: 27.7 PG
MCHC RBC AUTO-ENTMCNC: 32.9 G/DL
MCV RBC AUTO: 84.2 FL
MONOCYTES ABSOLUTE: 0.52 /ΜL
MONOCYTES RELATIVE PERCENT: 8.2 %
NEUTROPHILS ABSOLUTE: 3.47 /ΜL
NEUTROPHILS RELATIVE PERCENT: 54.4 %
NONHDLC SERPL-MCNC: NORMAL MG/DL
PDW BLD-RTO: NORMAL %
PLATELET # BLD: 411 K/ΜL
PMV BLD AUTO: 10.2 FL
POTASSIUM SERPL-SCNC: 4.4 MMOL/L
RBC # BLD: 5.12 10^6/ΜL
SODIUM BLD-SCNC: 141 MMOL/L
T4 TOTAL: 8.7
TOTAL PROTEIN: 6.5
TRIGL SERPL-MCNC: 107 MG/DL
TSH SERPL DL<=0.05 MIU/L-ACNC: 0.95 UIU/ML
VITAMIN D 25-HYDROXY: 26
VITAMIN D2, 25 HYDROXY: NORMAL
VITAMIN D3,25 HYDROXY: NORMAL
VLDLC SERPL CALC-MCNC: NORMAL MG/DL
WBC # BLD: 6.4 10^3/ML

## 2023-09-26 DIAGNOSIS — R73.01 ELEVATED FASTING GLUCOSE: ICD-10-CM

## 2023-09-28 ENCOUNTER — OFFICE VISIT (OUTPATIENT)
Dept: PRIMARY CARE CLINIC | Age: 58
End: 2023-09-28
Payer: COMMERCIAL

## 2023-09-28 VITALS
SYSTOLIC BLOOD PRESSURE: 122 MMHG | OXYGEN SATURATION: 98 % | WEIGHT: 172 LBS | HEIGHT: 62 IN | DIASTOLIC BLOOD PRESSURE: 78 MMHG | TEMPERATURE: 97.5 F | BODY MASS INDEX: 31.65 KG/M2 | HEART RATE: 80 BPM

## 2023-09-28 DIAGNOSIS — M85.80 OSTEOPENIA, UNSPECIFIED LOCATION: ICD-10-CM

## 2023-09-28 DIAGNOSIS — Z00.00 ANNUAL PHYSICAL EXAM: Primary | ICD-10-CM

## 2023-09-28 DIAGNOSIS — L65.8 OTHER SPECIFIED NONSCARRING HAIR LOSS: ICD-10-CM

## 2023-09-28 DIAGNOSIS — I34.1 MITRAL VALVE PROLAPSE: ICD-10-CM

## 2023-09-28 DIAGNOSIS — J45.30 MILD PERSISTENT ASTHMA WITHOUT COMPLICATION: Chronic | ICD-10-CM

## 2023-09-28 LAB
BILIRUBIN, POC: NORMAL
BLOOD URINE, POC: NORMAL
CLARITY, POC: CLEAR
COLOR, POC: YELLOW
GLUCOSE URINE, POC: NORMAL
KETONES, POC: NORMAL
LEUKOCYTE EST, POC: NORMAL
NITRITE, POC: NORMAL
PH, POC: 5.5
PROTEIN, POC: NORMAL
SPECIFIC GRAVITY, POC: >1.03
UROBILINOGEN, POC: 0.2

## 2023-09-28 PROCEDURE — 93000 ELECTROCARDIOGRAM COMPLETE: CPT | Performed by: FAMILY MEDICINE

## 2023-09-28 PROCEDURE — 81002 URINALYSIS NONAUTO W/O SCOPE: CPT | Performed by: FAMILY MEDICINE

## 2023-09-28 PROCEDURE — 99396 PREV VISIT EST AGE 40-64: CPT | Performed by: FAMILY MEDICINE

## 2023-09-28 RX ORDER — AZITHROMYCIN 250 MG/1
TABLET, FILM COATED ORAL
Qty: 1 PACKET | Refills: 0 | Status: SHIPPED | OUTPATIENT
Start: 2023-09-28

## 2023-09-28 SDOH — ECONOMIC STABILITY: FOOD INSECURITY: WITHIN THE PAST 12 MONTHS, YOU WORRIED THAT YOUR FOOD WOULD RUN OUT BEFORE YOU GOT MONEY TO BUY MORE.: NEVER TRUE

## 2023-09-28 SDOH — ECONOMIC STABILITY: INCOME INSECURITY: HOW HARD IS IT FOR YOU TO PAY FOR THE VERY BASICS LIKE FOOD, HOUSING, MEDICAL CARE, AND HEATING?: NOT HARD AT ALL

## 2023-09-28 SDOH — ECONOMIC STABILITY: FOOD INSECURITY: WITHIN THE PAST 12 MONTHS, THE FOOD YOU BOUGHT JUST DIDN'T LAST AND YOU DIDN'T HAVE MONEY TO GET MORE.: NEVER TRUE

## 2023-09-28 SDOH — ECONOMIC STABILITY: HOUSING INSECURITY
IN THE LAST 12 MONTHS, WAS THERE A TIME WHEN YOU DID NOT HAVE A STEADY PLACE TO SLEEP OR SLEPT IN A SHELTER (INCLUDING NOW)?: NO

## 2023-09-28 ASSESSMENT — PATIENT HEALTH QUESTIONNAIRE - PHQ9
2. FEELING DOWN, DEPRESSED OR HOPELESS: 0
SUM OF ALL RESPONSES TO PHQ QUESTIONS 1-9: 0
1. LITTLE INTEREST OR PLEASURE IN DOING THINGS: 0
SUM OF ALL RESPONSES TO PHQ QUESTIONS 1-9: 0
SUM OF ALL RESPONSES TO PHQ9 QUESTIONS 1 & 2: 0

## 2023-09-28 ASSESSMENT — ENCOUNTER SYMPTOMS
ALLERGIC/IMMUNOLOGIC NEGATIVE: 1
EYES NEGATIVE: 1
RESPIRATORY NEGATIVE: 1
GASTROINTESTINAL NEGATIVE: 1

## 2023-10-28 PROBLEM — Z00.00 ANNUAL PHYSICAL EXAM: Status: RESOLVED | Noted: 2022-06-30 | Resolved: 2023-10-28

## 2024-02-20 LAB
CHOLESTEROL, TOTAL: 208 MG/DL
CHOLESTEROL/HDL RATIO: 3.4
HDLC SERPL-MCNC: 61 MG/DL (ref 35–70)
LDL CHOLESTEROL CALCULATED: 126 MG/DL (ref 0–160)
NONHDLC SERPL-MCNC: NORMAL MG/DL
TRIGL SERPL-MCNC: 105 MG/DL
VLDLC SERPL CALC-MCNC: NORMAL MG/DL

## 2024-02-29 ENCOUNTER — HOSPITAL ENCOUNTER (OUTPATIENT)
Dept: GENERAL RADIOLOGY | Age: 59
Discharge: HOME | End: 2024-03-02
Payer: COMMERCIAL

## 2024-02-29 VITALS — WEIGHT: 170 LBS | BODY MASS INDEX: 31.09 KG/M2

## 2024-02-29 DIAGNOSIS — Z12.31 VISIT FOR SCREENING MAMMOGRAM: ICD-10-CM

## 2024-02-29 PROCEDURE — 77063 BREAST TOMOSYNTHESIS BI: CPT

## 2024-04-25 ENCOUNTER — OFFICE VISIT (OUTPATIENT)
Dept: PRIMARY CARE CLINIC | Age: 59
End: 2024-04-25
Payer: COMMERCIAL

## 2024-04-25 VITALS
TEMPERATURE: 98.6 F | HEIGHT: 62 IN | WEIGHT: 175 LBS | SYSTOLIC BLOOD PRESSURE: 138 MMHG | HEART RATE: 62 BPM | BODY MASS INDEX: 32.2 KG/M2 | OXYGEN SATURATION: 96 % | DIASTOLIC BLOOD PRESSURE: 84 MMHG

## 2024-04-25 DIAGNOSIS — L65.8 OTHER SPECIFIED NONSCARRING HAIR LOSS: ICD-10-CM

## 2024-04-25 DIAGNOSIS — R73.01 ELEVATED FASTING GLUCOSE: ICD-10-CM

## 2024-04-25 DIAGNOSIS — I34.1 MITRAL VALVE PROLAPSE: ICD-10-CM

## 2024-04-25 DIAGNOSIS — M85.80 OSTEOPENIA, UNSPECIFIED LOCATION: ICD-10-CM

## 2024-04-25 DIAGNOSIS — J45.21 MILD INTERMITTENT ASTHMA WITH ACUTE EXACERBATION: ICD-10-CM

## 2024-04-25 DIAGNOSIS — J45.21 MILD INTERMITTENT ASTHMA WITH ACUTE EXACERBATION: Primary | ICD-10-CM

## 2024-04-25 LAB
ALBUMIN: 4.4 G/DL (ref 3.5–5.2)
ALP BLD-CCNC: 101 U/L (ref 35–104)
ALT SERPL-CCNC: 11 U/L (ref 0–32)
ANION GAP SERPL CALCULATED.3IONS-SCNC: 18 MMOL/L (ref 7–16)
AST SERPL-CCNC: 13 U/L (ref 0–31)
BASOPHILS ABSOLUTE: 0.08 K/UL (ref 0–0.2)
BASOPHILS RELATIVE PERCENT: 1 % (ref 0–2)
BILIRUB SERPL-MCNC: 0.7 MG/DL (ref 0–1.2)
BUN BLDV-MCNC: 15 MG/DL (ref 6–20)
CALCIUM SERPL-MCNC: 9.4 MG/DL (ref 8.6–10.2)
CHLORIDE BLD-SCNC: 105 MMOL/L (ref 98–107)
CHOLESTEROL: 214 MG/DL
CO2: 21 MMOL/L (ref 22–29)
CREAT SERPL-MCNC: 0.6 MG/DL (ref 0.5–1)
EOSINOPHILS ABSOLUTE: 0.22 K/UL (ref 0.05–0.5)
EOSINOPHILS RELATIVE PERCENT: 4 % (ref 0–6)
GFR SERPL CREATININE-BSD FRML MDRD: >90 ML/MIN/1.73M2
GLUCOSE BLD-MCNC: 107 MG/DL (ref 74–99)
HBA1C MFR BLD: 5.3 % (ref 4–5.6)
HCT VFR BLD CALC: 45.7 % (ref 34–48)
HDLC SERPL-MCNC: 59 MG/DL
HEMOGLOBIN: 14.6 G/DL (ref 11.5–15.5)
IMMATURE GRANULOCYTES %: 1 % (ref 0–5)
IMMATURE GRANULOCYTES ABSOLUTE: 0.03 K/UL (ref 0–0.58)
LDL CHOLESTEROL: 137 MG/DL
LYMPHOCYTES ABSOLUTE: 1.35 K/UL (ref 1.5–4)
LYMPHOCYTES RELATIVE PERCENT: 24 % (ref 20–42)
MCH RBC QN AUTO: 27.3 PG (ref 26–35)
MCHC RBC AUTO-ENTMCNC: 31.9 G/DL (ref 32–34.5)
MCV RBC AUTO: 85.4 FL (ref 80–99.9)
MONOCYTES ABSOLUTE: 0.39 K/UL (ref 0.1–0.95)
MONOCYTES RELATIVE PERCENT: 7 % (ref 2–12)
NEUTROPHILS ABSOLUTE: 3.46 K/UL (ref 1.8–7.3)
NEUTROPHILS RELATIVE PERCENT: 63 % (ref 43–80)
PDW BLD-RTO: 12.5 % (ref 11.5–15)
PLATELET # BLD: 407 K/UL (ref 130–450)
PMV BLD AUTO: 10.3 FL (ref 7–12)
POTASSIUM SERPL-SCNC: 4.7 MMOL/L (ref 3.5–5)
RBC # BLD: 5.35 M/UL (ref 3.5–5.5)
SODIUM BLD-SCNC: 144 MMOL/L (ref 132–146)
T4 TOTAL: 9.9 UG/DL (ref 4.5–11.7)
TOTAL PROTEIN: 7.2 G/DL (ref 6.4–8.3)
TRIGL SERPL-MCNC: 90 MG/DL
TSH SERPL DL<=0.05 MIU/L-ACNC: 1.02 UIU/ML (ref 0.27–4.2)
VLDLC SERPL CALC-MCNC: 18 MG/DL
WBC # BLD: 5.5 K/UL (ref 4.5–11.5)

## 2024-04-25 PROCEDURE — 99214 OFFICE O/P EST MOD 30 MIN: CPT | Performed by: FAMILY MEDICINE

## 2024-04-25 ASSESSMENT — PATIENT HEALTH QUESTIONNAIRE - PHQ9
2. FEELING DOWN, DEPRESSED OR HOPELESS: NOT AT ALL
1. LITTLE INTEREST OR PLEASURE IN DOING THINGS: NOT AT ALL
SUM OF ALL RESPONSES TO PHQ QUESTIONS 1-9: 0
SUM OF ALL RESPONSES TO PHQ9 QUESTIONS 1 & 2: 0
SUM OF ALL RESPONSES TO PHQ QUESTIONS 1-9: 0

## 2024-04-25 NOTE — PROGRESS NOTES
felt hot    Doxycycline     Morphine Nausea Only    Demerol Hcl [Meperidine] Palpitations       Social History     Tobacco Use    Smoking status: Never    Smokeless tobacco: Never   Vaping Use    Vaping Use: Never used   Substance Use Topics    Alcohol use: No     Comment: social drinker     Drug use: No      Past Surgical History:   Procedure Laterality Date    APPENDECTOMY  2001    BREAST BIOPSY Right     benign    COLONOSCOPY  08/09/2017    ECTOPIC PREGNANCY SURGERY  1995    THYROID SURGERY  1985    partial thyroidectomy     Family History   Problem Relation Age of Onset    High Blood Pressure Mother     Heart Failure Father     Stroke Father     Pacemaker Father     Diabetes Father     COPD Brother     Breast Cancer Paternal Aunt      Past Medical History:   Diagnosis Date    Asthma     no recent issues, was released from Dr. Stokes, feel it is allergen induced , seasonal    Murmur     h/o, no longer present       Vitals:    04/25/24 0811   BP: 138/84   Pulse: 62   Temp: 98.6 °F (37 °C)   SpO2: 96%   Weight: 79.4 kg (175 lb)   Height: 1.575 m (5' 2\")     BP Readings from Last 3 Encounters:   04/25/24 138/84   09/28/23 122/78   08/24/23 (!) 142/80        Physical Exam  Vitals and nursing note reviewed.   Constitutional:       Appearance: She is well-developed.   HENT:      Head: Normocephalic.      Right Ear: External ear normal.      Left Ear: External ear normal.      Nose: Nose normal.   Eyes:      Conjunctiva/sclera: Conjunctivae normal.      Pupils: Pupils are equal, round, and reactive to light.   Cardiovascular:      Rate and Rhythm: Normal rate.   Pulmonary:      Breath sounds: Normal breath sounds.   Abdominal:      General: Bowel sounds are normal.      Palpations: Abdomen is soft.   Musculoskeletal:         General: Normal range of motion.      Cervical back: Normal range of motion and neck supple.   Skin:     General: Skin is warm and dry.   Neurological:      Mental Status: She is alert and oriented

## 2024-07-18 ENCOUNTER — HOSPITAL ENCOUNTER (OUTPATIENT)
Dept: GENERAL RADIOLOGY | Age: 59
Discharge: HOME | End: 2024-07-20
Payer: COMMERCIAL

## 2024-07-18 DIAGNOSIS — R92.2 INCONCLUSIVE MAMMOGRAM: ICD-10-CM

## 2024-07-18 DIAGNOSIS — R92.30 DENSE BREAST TISSUE: ICD-10-CM

## 2024-07-18 PROCEDURE — 76641 ULTRASOUND BREAST COMPLETE: CPT

## 2024-10-31 ENCOUNTER — OFFICE VISIT (OUTPATIENT)
Dept: PRIMARY CARE CLINIC | Age: 59
End: 2024-10-31

## 2024-10-31 VITALS
SYSTOLIC BLOOD PRESSURE: 140 MMHG | HEIGHT: 62 IN | OXYGEN SATURATION: 96 % | DIASTOLIC BLOOD PRESSURE: 84 MMHG | WEIGHT: 173 LBS | BODY MASS INDEX: 31.83 KG/M2 | HEART RATE: 82 BPM | TEMPERATURE: 98 F

## 2024-10-31 DIAGNOSIS — R73.01 ELEVATED FASTING GLUCOSE: ICD-10-CM

## 2024-10-31 DIAGNOSIS — Z00.00 ANNUAL PHYSICAL EXAM: ICD-10-CM

## 2024-10-31 DIAGNOSIS — R07.89 CHEST HEAVINESS: ICD-10-CM

## 2024-10-31 DIAGNOSIS — Z00.00 ANNUAL PHYSICAL EXAM: Primary | ICD-10-CM

## 2024-10-31 DIAGNOSIS — L65.8 OTHER SPECIFIED NONSCARRING HAIR LOSS: ICD-10-CM

## 2024-10-31 DIAGNOSIS — J45.30 MILD PERSISTENT ASTHMA WITHOUT COMPLICATION: Chronic | ICD-10-CM

## 2024-10-31 DIAGNOSIS — K21.9 GASTROESOPHAGEAL REFLUX DISEASE WITHOUT ESOPHAGITIS: ICD-10-CM

## 2024-10-31 LAB
ALBUMIN: 4.3 G/DL (ref 3.5–5.2)
ALP BLD-CCNC: 105 U/L (ref 35–104)
ALT SERPL-CCNC: 14 U/L (ref 0–32)
ANION GAP SERPL CALCULATED.3IONS-SCNC: 11 MMOL/L (ref 7–16)
AST SERPL-CCNC: 12 U/L (ref 0–31)
BASOPHILS ABSOLUTE: 0.09 K/UL (ref 0–0.2)
BASOPHILS RELATIVE PERCENT: 2 % (ref 0–2)
BILIRUB SERPL-MCNC: 0.7 MG/DL (ref 0–1.2)
BILIRUBIN, POC: NORMAL
BLOOD URINE, POC: NORMAL
BUN BLDV-MCNC: 16 MG/DL (ref 6–20)
CALCIUM SERPL-MCNC: 9.3 MG/DL (ref 8.6–10.2)
CHLORIDE BLD-SCNC: 103 MMOL/L (ref 98–107)
CHOLESTEROL, TOTAL: 209 MG/DL
CLARITY, POC: CLEAR
CO2: 26 MMOL/L (ref 22–29)
COLOR, POC: NORMAL
CREAT SERPL-MCNC: 0.7 MG/DL (ref 0.5–1)
EOSINOPHILS ABSOLUTE: 0.25 K/UL (ref 0.05–0.5)
EOSINOPHILS RELATIVE PERCENT: 5 % (ref 0–6)
FOLATE: 6.6 NG/ML (ref 4.8–24.2)
GFR, ESTIMATED: >90 ML/MIN/1.73M2
GLUCOSE BLD-MCNC: 107 MG/DL (ref 74–99)
GLUCOSE URINE, POC: NORMAL MG/DL
HBA1C MFR BLD: 5.5 % (ref 4–5.6)
HCT VFR BLD CALC: 43.8 % (ref 34–48)
HDLC SERPL-MCNC: 56 MG/DL
HEMOGLOBIN: 13.9 G/DL (ref 11.5–15.5)
IMMATURE GRANULOCYTES %: 1 % (ref 0–5)
IMMATURE GRANULOCYTES ABSOLUTE: 0.03 K/UL (ref 0–0.58)
KETONES, POC: NORMAL MG/DL
LDL CHOLESTEROL: 136 MG/DL
LEUKOCYTE EST, POC: NORMAL
LYMPHOCYTES ABSOLUTE: 1.18 K/UL (ref 1.5–4)
LYMPHOCYTES RELATIVE PERCENT: 23 % (ref 20–42)
MCH RBC QN AUTO: 27.1 PG (ref 26–35)
MCHC RBC AUTO-ENTMCNC: 31.7 G/DL (ref 32–34.5)
MCV RBC AUTO: 85.4 FL (ref 80–99.9)
MONOCYTES ABSOLUTE: 0.47 K/UL (ref 0.1–0.95)
MONOCYTES RELATIVE PERCENT: 9 % (ref 2–12)
NEUTROPHILS ABSOLUTE: 3.19 K/UL (ref 1.8–7.3)
NEUTROPHILS RELATIVE PERCENT: 61 % (ref 43–80)
NITRITE, POC: NORMAL
PDW BLD-RTO: 12.5 % (ref 11.5–15)
PH, POC: 6.5
PLATELET # BLD: 429 K/UL (ref 130–450)
PMV BLD AUTO: 10.1 FL (ref 7–12)
POTASSIUM SERPL-SCNC: 4.1 MMOL/L (ref 3.5–5)
PROTEIN, POC: NORMAL MG/DL
RBC # BLD: 5.13 M/UL (ref 3.5–5.5)
SODIUM BLD-SCNC: 140 MMOL/L (ref 132–146)
SPECIFIC GRAVITY, POC: 1.01
THYROXINE (T4): 9.5 UG/DL (ref 4.5–11.7)
TOTAL PROTEIN: 7 G/DL (ref 6.4–8.3)
TRIGL SERPL-MCNC: 83 MG/DL
TSH SERPL DL<=0.05 MIU/L-ACNC: 0.93 UIU/ML (ref 0.27–4.2)
UROBILINOGEN, POC: 0.2 MG/DL
VITAMIN B-12: 631 PG/ML (ref 211–946)
VLDLC SERPL CALC-MCNC: 17 MG/DL
WBC # BLD: 5.2 K/UL (ref 4.5–11.5)

## 2024-10-31 RX ORDER — FLUTICASONE PROPIONATE 50 MCG
2 SPRAY, SUSPENSION (ML) NASAL DAILY
Qty: 1 EACH | Refills: 5 | Status: SHIPPED | OUTPATIENT
Start: 2024-10-31

## 2024-10-31 ASSESSMENT — ENCOUNTER SYMPTOMS
RESPIRATORY NEGATIVE: 1
GASTROINTESTINAL NEGATIVE: 1
EYES NEGATIVE: 1
ALLERGIC/IMMUNOLOGIC NEGATIVE: 1

## 2024-10-31 NOTE — PROGRESS NOTES
10/31/24     Rina Flores    : 1965 Sex: female   Age: 59 y.o.      Chief Complaint   Patient presents with    Annual Exam       Prior to Admission medications    Medication Sig Start Date End Date Taking? Authorizing Provider   fluticasone (FLONASE) 50 MCG/ACT nasal spray 2 sprays by Each Nostril route daily 10/31/24  Yes Shad Rios DO   albuterol sulfate HFA (PROAIR HFA) 108 (90 Base) MCG/ACT inhaler Inhale 2 puffs into the lungs every 4 hours as needed for Wheezing or Shortness of Breath 24  Yes Mike Stokes MD   fluticasone furoate-vilanterol (BREO ELLIPTA) 100-25 MCG/ACT inhaler Inhale 1 puff into the lungs daily 24  Yes Mike Stokes MD          HPI: Patient is seen today health maintenance physical reflux disease recent problems with intermittent chest heaviness and EKG suggestive of some anterior T wave changes recommending cardiology evaluation and probable nuclear stress study.  Medically otherwise stable.          Review of Systems   Constitutional: Negative.    HENT: Negative.     Eyes: Negative.    Respiratory: Negative.     Cardiovascular:  Positive for chest pain.   Gastrointestinal: Negative.    Endocrine: Negative.    Genitourinary: Negative.    Musculoskeletal: Negative.    Skin: Negative.    Allergic/Immunologic: Negative.    Neurological: Negative.    Hematological: Negative.    Psychiatric/Behavioral: Negative.                Current Outpatient Medications:     fluticasone (FLONASE) 50 MCG/ACT nasal spray, 2 sprays by Each Nostril route daily, Disp: 1 each, Rfl: 5    albuterol sulfate HFA (PROAIR HFA) 108 (90 Base) MCG/ACT inhaler, Inhale 2 puffs into the lungs every 4 hours as needed for Wheezing or Shortness of Breath, Disp: 3 each, Rfl: 3    fluticasone furoate-vilanterol (BREO ELLIPTA) 100-25 MCG/ACT inhaler, Inhale 1 puff into the lungs daily, Disp: 3 each, Rfl: 3    Allergies   Allergen Reactions    Codeine Other (See Comments)     Heart raced,

## 2024-11-05 PROBLEM — M25.562 ACUTE PAIN OF LEFT KNEE: Status: RESOLVED | Noted: 2021-06-29 | Resolved: 2024-11-05

## 2024-11-05 PROBLEM — R07.9 CHEST PAIN: Status: ACTIVE | Noted: 2024-11-05

## 2024-11-05 PROBLEM — J45.21 MILD INTERMITTENT ASTHMA WITH ACUTE EXACERBATION: Status: RESOLVED | Noted: 2020-06-08 | Resolved: 2024-11-05

## 2024-11-05 PROBLEM — R73.01 ELEVATED FASTING GLUCOSE: Status: RESOLVED | Noted: 2020-06-08 | Resolved: 2024-11-05

## 2024-11-05 PROBLEM — R07.89 CHEST HEAVINESS: Status: RESOLVED | Noted: 2020-01-23 | Resolved: 2024-11-05

## 2024-11-05 PROBLEM — L65.8 OTHER SPECIFIED NONSCARRING HAIR LOSS: Status: RESOLVED | Noted: 2023-09-28 | Resolved: 2024-11-05

## 2024-11-05 PROBLEM — Z00.00 ANNUAL PHYSICAL EXAM: Status: RESOLVED | Noted: 2022-06-30 | Resolved: 2024-11-05

## 2024-11-05 PROBLEM — M17.12 PRIMARY OSTEOARTHRITIS OF LEFT KNEE: Status: RESOLVED | Noted: 2022-06-30 | Resolved: 2024-11-05

## 2024-11-05 PROBLEM — U07.1 COVID-19: Status: RESOLVED | Noted: 2021-01-25 | Resolved: 2024-11-05

## 2024-11-06 ENCOUNTER — OFFICE VISIT (OUTPATIENT)
Dept: CARDIOLOGY CLINIC | Age: 59
End: 2024-11-06
Payer: COMMERCIAL

## 2024-11-06 VITALS
RESPIRATION RATE: 18 BRPM | WEIGHT: 175.6 LBS | DIASTOLIC BLOOD PRESSURE: 80 MMHG | SYSTOLIC BLOOD PRESSURE: 144 MMHG | HEART RATE: 65 BPM | BODY MASS INDEX: 32.31 KG/M2 | HEIGHT: 62 IN

## 2024-11-06 DIAGNOSIS — R07.9 CHEST PAIN, UNSPECIFIED TYPE: Primary | ICD-10-CM

## 2024-11-06 PROCEDURE — 93000 ELECTROCARDIOGRAM COMPLETE: CPT | Performed by: INTERNAL MEDICINE

## 2024-11-06 PROCEDURE — 99203 OFFICE O/P NEW LOW 30 MIN: CPT | Performed by: INTERNAL MEDICINE

## 2024-11-06 NOTE — PROGRESS NOTES
Chief Complaint   Patient presents with    Chest Pain       Patient Active Problem List    Diagnosis Date Noted    Chest pain 11/05/2024    Osteopenia 10/07/2021    Gastroesophageal reflux disease without esophagitis 06/08/2020    Mild persistent asthma 01/23/2020    Pulmonary nodules 01/23/2020       Current Outpatient Medications   Medication Sig Dispense Refill    fluticasone (FLONASE) 50 MCG/ACT nasal spray 2 sprays by Each Nostril route daily 1 each 5    albuterol sulfate HFA (PROAIR HFA) 108 (90 Base) MCG/ACT inhaler Inhale 2 puffs into the lungs every 4 hours as needed for Wheezing or Shortness of Breath 3 each 3    fluticasone furoate-vilanterol (BREO ELLIPTA) 100-25 MCG/ACT inhaler Inhale 1 puff into the lungs daily 3 each 3     No current facility-administered medications for this visit.        Allergies   Allergen Reactions    Codeine Other (See Comments)     Heart raced, felt hot    Doxycycline     Morphine Nausea Only    Demerol Hcl [Meperidine] Palpitations       Vitals:    11/06/24 1212   BP: (!) 144/80   Pulse: 65   Resp: 18   Weight: 79.7 kg (175 lb 9.6 oz)   Height: 1.575 m (5' 2\")                 SUBJECTIVE: Rina Flores presents to the office today for consult - dr spear.  Hx of chest pains since at least 2-016 - two normal stress tests and at least three normal echoes  Just at [facility] yesterday with symtoms - full w/u reviewed - neg, advised omeprazole     She complains of chest pain - base of throat - present even now, and denies    Impairment of her ability to exercise or do AODLs .          Physical Exam   BP (!) 144/80   Pulse 65   Resp 18   Ht 1.575 m (5' 2\")   Wt 79.7 kg (175 lb 9.6 oz)   LMP 05/31/2017   BMI 32.12 kg/m²   Constitutional: Oriented to person, place, and time. Well-developed and well-nourished. No distress.    Neck: No JVD present. Carotid bruit is not present.   Cardiovascular: Normal rate, regular rhythm, normal heart sounds and intact distal pulses.  No

## 2024-12-05 ENCOUNTER — OFFICE VISIT (OUTPATIENT)
Dept: PRIMARY CARE CLINIC | Age: 59
End: 2024-12-05
Payer: COMMERCIAL

## 2024-12-05 VITALS
SYSTOLIC BLOOD PRESSURE: 142 MMHG | HEART RATE: 66 BPM | TEMPERATURE: 98.4 F | OXYGEN SATURATION: 98 % | HEIGHT: 62 IN | BODY MASS INDEX: 32.02 KG/M2 | DIASTOLIC BLOOD PRESSURE: 88 MMHG | WEIGHT: 174 LBS

## 2024-12-05 DIAGNOSIS — R07.82 INTERCOSTAL PAIN: ICD-10-CM

## 2024-12-05 DIAGNOSIS — J45.30 MILD PERSISTENT ASTHMA WITHOUT COMPLICATION: Chronic | ICD-10-CM

## 2024-12-05 DIAGNOSIS — K21.9 GASTROESOPHAGEAL REFLUX DISEASE WITHOUT ESOPHAGITIS: Primary | ICD-10-CM

## 2024-12-05 PROCEDURE — 99214 OFFICE O/P EST MOD 30 MIN: CPT | Performed by: FAMILY MEDICINE

## 2024-12-05 NOTE — PROGRESS NOTES
24     Rina Flores    : 1965 Sex: female   Age: 59 y.o.      Chief Complaint   Patient presents with    Hypertension     Pt here for follow up with cardiology and BP.       Prior to Admission medications    Medication Sig Start Date End Date Taking? Authorizing Provider   fluticasone (FLONASE) 50 MCG/ACT nasal spray 2 sprays by Each Nostril route daily 10/31/24   Shad Rios,    albuterol sulfate HFA (PROAIR HFA) 108 (90 Base) MCG/ACT inhaler Inhale 2 puffs into the lungs every 4 hours as needed for Wheezing or Shortness of Breath 24   Mike Stokes MD   fluticasone furoate-vilanterol (BREO ELLIPTA) 100-25 MCG/ACT inhaler Inhale 1 puff into the lungs daily 24   Mike Stokes MD          HPI: Patient evaluated today with reflux ,intercostal discomfort asthmatic bronchitis.  Patient did undergo evaluation [facility] with chest discomfort and workup was negative.  She was seen by Dr. Bennie Clark and not felt to be cardiac.  She has been off of proton pump inhibitors and denies any issues with reflux we will maintain off.  Pain in the chest currently is more consistent with musculoskeletal discomfort and I am recommending some ibuprofen or Tylenol if necessary.  Patient does understand if problems with exertional chest discomfort exertional shortness of breath would need immediate follow-up at that time.        Review of Systems   Constitutional: Negative.    HENT: Negative.     Eyes: Negative.    Respiratory: Negative.     Gastrointestinal: Negative.    Endocrine: Negative.    Genitourinary: Negative.    Musculoskeletal: Negative.    Skin: Negative.    Allergic/Immunologic: Negative.    Neurological: Negative.    Hematological: Negative.    Psychiatric/Behavioral: Negative.        Today systems review stable appears improved.        Current Outpatient Medications:     fluticasone (FLONASE) 50 MCG/ACT nasal spray, 2 sprays by Each Nostril route daily, Disp: 1

## 2025-03-13 ENCOUNTER — HOSPITAL ENCOUNTER (OUTPATIENT)
Dept: GENERAL RADIOLOGY | Age: 60
Discharge: HOME | End: 2025-03-15
Payer: COMMERCIAL

## 2025-03-13 VITALS — BODY MASS INDEX: 33.38 KG/M2 | HEIGHT: 60 IN | WEIGHT: 170 LBS

## 2025-03-13 DIAGNOSIS — Z12.31 ENCOUNTER FOR SCREENING MAMMOGRAM FOR MALIGNANT NEOPLASM OF BREAST: ICD-10-CM

## 2025-03-13 DIAGNOSIS — Z13.820 SPECIAL SCREENING FOR OSTEOPOROSIS: ICD-10-CM

## 2025-03-13 PROCEDURE — 77063 BREAST TOMOSYNTHESIS BI: CPT

## 2025-03-13 PROCEDURE — 77080 DXA BONE DENSITY AXIAL: CPT
